# Patient Record
Sex: MALE | Race: WHITE | Employment: FULL TIME | URBAN - METROPOLITAN AREA
[De-identification: names, ages, dates, MRNs, and addresses within clinical notes are randomized per-mention and may not be internally consistent; named-entity substitution may affect disease eponyms.]

---

## 2017-03-07 ENCOUNTER — OFFICE VISIT (OUTPATIENT)
Dept: FAMILY MEDICINE CLINIC | Age: 57
End: 2017-03-07

## 2017-03-07 VITALS
HEART RATE: 64 BPM | HEIGHT: 73 IN | DIASTOLIC BLOOD PRESSURE: 84 MMHG | WEIGHT: 218 LBS | RESPIRATION RATE: 12 BRPM | TEMPERATURE: 98.3 F | SYSTOLIC BLOOD PRESSURE: 128 MMHG | BODY MASS INDEX: 28.89 KG/M2

## 2017-03-07 DIAGNOSIS — F41.0 PANIC ATTACK: ICD-10-CM

## 2017-03-07 DIAGNOSIS — K21.9 GASTROESOPHAGEAL REFLUX DISEASE WITHOUT ESOPHAGITIS: ICD-10-CM

## 2017-03-07 DIAGNOSIS — G62.9 NEUROPATHY: ICD-10-CM

## 2017-03-07 DIAGNOSIS — I65.29 STENOSIS OF CAROTID ARTERY, UNSPECIFIED LATERALITY: ICD-10-CM

## 2017-03-07 DIAGNOSIS — I25.10 CORONARY ARTERY DISEASE INVOLVING NATIVE CORONARY ARTERY OF NATIVE HEART WITHOUT ANGINA PECTORIS: ICD-10-CM

## 2017-03-07 DIAGNOSIS — F41.9 ANXIETY: Primary | ICD-10-CM

## 2017-03-07 PROCEDURE — 3017F COLORECTAL CA SCREEN DOC REV: CPT | Performed by: FAMILY MEDICINE

## 2017-03-07 PROCEDURE — 99214 OFFICE O/P EST MOD 30 MIN: CPT | Performed by: FAMILY MEDICINE

## 2017-03-07 PROCEDURE — 1036F TOBACCO NON-USER: CPT | Performed by: FAMILY MEDICINE

## 2017-03-07 PROCEDURE — G8484 FLU IMMUNIZE NO ADMIN: HCPCS | Performed by: FAMILY MEDICINE

## 2017-03-07 PROCEDURE — G8419 CALC BMI OUT NRM PARAM NOF/U: HCPCS | Performed by: FAMILY MEDICINE

## 2017-03-07 PROCEDURE — G8427 DOCREV CUR MEDS BY ELIG CLIN: HCPCS | Performed by: FAMILY MEDICINE

## 2017-03-07 PROCEDURE — G8598 ASA/ANTIPLAT THER USED: HCPCS | Performed by: FAMILY MEDICINE

## 2017-03-07 RX ORDER — LORAZEPAM 0.5 MG/1
0.5 TABLET ORAL EVERY 8 HOURS PRN
Qty: 30 TABLET | Refills: 0 | Status: SHIPPED | OUTPATIENT
Start: 2017-03-07 | End: 2017-04-17

## 2017-03-07 RX ORDER — CITALOPRAM 10 MG/1
10 TABLET ORAL DAILY
Qty: 30 TABLET | Refills: 1 | Status: SHIPPED | OUTPATIENT
Start: 2017-03-07 | End: 2017-04-17 | Stop reason: SDUPTHER

## 2017-03-07 RX ORDER — LORAZEPAM 0.5 MG/1
TABLET ORAL
COMMUNITY
Start: 2017-03-05

## 2017-04-17 ENCOUNTER — OFFICE VISIT (OUTPATIENT)
Dept: FAMILY MEDICINE CLINIC | Age: 57
End: 2017-04-17

## 2017-04-17 VITALS
SYSTOLIC BLOOD PRESSURE: 128 MMHG | TEMPERATURE: 98 F | DIASTOLIC BLOOD PRESSURE: 80 MMHG | BODY MASS INDEX: 28.12 KG/M2 | HEIGHT: 73 IN | RESPIRATION RATE: 16 BRPM | WEIGHT: 212.2 LBS | HEART RATE: 70 BPM

## 2017-04-17 DIAGNOSIS — I65.29 STENOSIS OF CAROTID ARTERY, UNSPECIFIED LATERALITY: ICD-10-CM

## 2017-04-17 DIAGNOSIS — I25.10 CORONARY ARTERY DISEASE INVOLVING NATIVE CORONARY ARTERY OF NATIVE HEART WITHOUT ANGINA PECTORIS: Primary | ICD-10-CM

## 2017-04-17 DIAGNOSIS — G62.9 NEUROPATHY: ICD-10-CM

## 2017-04-17 DIAGNOSIS — K21.9 GASTROESOPHAGEAL REFLUX DISEASE WITHOUT ESOPHAGITIS: ICD-10-CM

## 2017-04-17 DIAGNOSIS — Z12.11 SCREENING FOR MALIGNANT NEOPLASM OF COLON: ICD-10-CM

## 2017-04-17 PROCEDURE — G8598 ASA/ANTIPLAT THER USED: HCPCS | Performed by: FAMILY MEDICINE

## 2017-04-17 PROCEDURE — 1036F TOBACCO NON-USER: CPT | Performed by: FAMILY MEDICINE

## 2017-04-17 PROCEDURE — 99214 OFFICE O/P EST MOD 30 MIN: CPT | Performed by: FAMILY MEDICINE

## 2017-04-17 PROCEDURE — G8419 CALC BMI OUT NRM PARAM NOF/U: HCPCS | Performed by: FAMILY MEDICINE

## 2017-04-17 PROCEDURE — 3017F COLORECTAL CA SCREEN DOC REV: CPT | Performed by: FAMILY MEDICINE

## 2017-04-17 PROCEDURE — G8427 DOCREV CUR MEDS BY ELIG CLIN: HCPCS | Performed by: FAMILY MEDICINE

## 2017-04-17 RX ORDER — LORAZEPAM 0.5 MG/1
0.5 TABLET ORAL EVERY 8 HOURS PRN
Qty: 30 TABLET | Refills: 0 | Status: SHIPPED | OUTPATIENT
Start: 2017-04-17 | End: 2018-02-08 | Stop reason: SDUPTHER

## 2017-04-17 RX ORDER — CITALOPRAM 10 MG/1
10 TABLET ORAL DAILY
Qty: 30 TABLET | Refills: 3 | Status: SHIPPED | OUTPATIENT
Start: 2017-04-17 | End: 2017-05-09 | Stop reason: SDUPTHER

## 2017-04-17 ASSESSMENT — PATIENT HEALTH QUESTIONNAIRE - PHQ9
1. LITTLE INTEREST OR PLEASURE IN DOING THINGS: 0
SUM OF ALL RESPONSES TO PHQ9 QUESTIONS 1 & 2: 0
2. FEELING DOWN, DEPRESSED OR HOPELESS: 0
SUM OF ALL RESPONSES TO PHQ QUESTIONS 1-9: 0

## 2017-05-09 RX ORDER — CITALOPRAM 10 MG/1
10 TABLET ORAL DAILY
Qty: 30 TABLET | Refills: 3 | Status: SHIPPED | OUTPATIENT
Start: 2017-05-09 | End: 2018-02-08 | Stop reason: SDUPTHER

## 2018-02-06 RX ORDER — CITALOPRAM 10 MG/1
10 TABLET ORAL DAILY
Qty: 30 TABLET | Refills: 0 | OUTPATIENT
Start: 2018-02-06 | End: 2018-03-08

## 2018-02-06 RX ORDER — LORAZEPAM 0.5 MG/1
0.5 TABLET ORAL EVERY 8 HOURS PRN
Qty: 30 TABLET | Refills: 0 | OUTPATIENT
Start: 2018-02-06 | End: 2018-03-08

## 2018-02-06 NOTE — TELEPHONE ENCOUNTER
Pt called requesting a refill on his medication. He stated he has one more of each pill left. He would like to have some to get him through until he sees you on 2/22/18. PATIENT LAST SEEN 7/17/17. LAST REFILL 5/9/17 (celexa) and 4/17/17 (ativan).     Future Appointments  Date Time Provider Anastasia Reno   2/22/2018 3:00 PM Villa Ram MD [facility]

## 2018-02-08 RX ORDER — CITALOPRAM 10 MG/1
10 TABLET ORAL DAILY
Qty: 30 TABLET | Refills: 3 | Status: SHIPPED | OUTPATIENT
Start: 2018-02-08 | End: 2018-05-06 | Stop reason: SDUPTHER

## 2018-02-08 RX ORDER — LORAZEPAM 0.5 MG/1
0.5 TABLET ORAL EVERY 8 HOURS PRN
Qty: 30 TABLET | Refills: 0 | Status: SHIPPED | OUTPATIENT
Start: 2018-02-08 | End: 2019-02-04 | Stop reason: SDUPTHER

## 2018-02-08 NOTE — TELEPHONE ENCOUNTER
Controlled Substances Monitoring: The Prescription Monitoring Report for this patient was reviewed today. Scott Hewitt MD)    No signs of potential drug abuse or diversion identified. Scott Hewitt MD)    Severe pain not adequately treated with lower dose. Scott Hewitt MD)    Dose reduction has been attempted.  Scott Hewitt MD)

## 2018-02-22 ENCOUNTER — OFFICE VISIT (OUTPATIENT)
Dept: FAMILY MEDICINE CLINIC | Age: 58
End: 2018-02-22
Payer: COMMERCIAL

## 2018-02-22 VITALS
DIASTOLIC BLOOD PRESSURE: 84 MMHG | TEMPERATURE: 98.6 F | SYSTOLIC BLOOD PRESSURE: 126 MMHG | HEART RATE: 80 BPM | BODY MASS INDEX: 29.29 KG/M2 | RESPIRATION RATE: 18 BRPM | WEIGHT: 221 LBS | HEIGHT: 73 IN

## 2018-02-22 DIAGNOSIS — K21.9 GASTROESOPHAGEAL REFLUX DISEASE WITHOUT ESOPHAGITIS: ICD-10-CM

## 2018-02-22 DIAGNOSIS — Z12.5 PROSTATE CANCER SCREENING: ICD-10-CM

## 2018-02-22 DIAGNOSIS — F41.1 GAD (GENERALIZED ANXIETY DISORDER): ICD-10-CM

## 2018-02-22 DIAGNOSIS — I65.29 STENOSIS OF CAROTID ARTERY, UNSPECIFIED LATERALITY: ICD-10-CM

## 2018-02-22 DIAGNOSIS — G62.9 NEUROPATHY: ICD-10-CM

## 2018-02-22 DIAGNOSIS — I25.10 CORONARY ARTERY DISEASE INVOLVING NATIVE CORONARY ARTERY OF NATIVE HEART WITHOUT ANGINA PECTORIS: Primary | ICD-10-CM

## 2018-02-22 PROCEDURE — G8484 FLU IMMUNIZE NO ADMIN: HCPCS | Performed by: FAMILY MEDICINE

## 2018-02-22 PROCEDURE — G8419 CALC BMI OUT NRM PARAM NOF/U: HCPCS | Performed by: FAMILY MEDICINE

## 2018-02-22 PROCEDURE — 3017F COLORECTAL CA SCREEN DOC REV: CPT | Performed by: FAMILY MEDICINE

## 2018-02-22 PROCEDURE — 1036F TOBACCO NON-USER: CPT | Performed by: FAMILY MEDICINE

## 2018-02-22 PROCEDURE — 99214 OFFICE O/P EST MOD 30 MIN: CPT | Performed by: FAMILY MEDICINE

## 2018-02-22 PROCEDURE — G8427 DOCREV CUR MEDS BY ELIG CLIN: HCPCS | Performed by: FAMILY MEDICINE

## 2018-02-22 PROCEDURE — G8599 NO ASA/ANTIPLAT THER USE RNG: HCPCS | Performed by: FAMILY MEDICINE

## 2018-02-22 NOTE — PROGRESS NOTES
Chief Complaint   Patient presents with    Anxiety   panic attack symptoms are improved at this time  Less stress at work  Medications help  He would like refill  Worse with stress in his life  Previous Similar Symptoms    anxiety symptoms also present for now  No depression symptoms  No suicidal/homocidal ideation. No psychotic or manic symptoms. atypical cp has resolved  No angina  He declines further testing    gerd sx have been stable  No red flag symptoms reported  No bleeding    Needs colon cancer screening  He will consider but declines at this time    Patient presents for  exam.        Patient Active Problem List   Diagnosis    CAD (coronary artery disease)    GERD (gastroesophageal reflux disease)    Neuropathy ([facility] [address]    Carotid stenosis    RIVAS (generalized anxiety disorder)       has a current medication list which includes the following prescription(s): citalopram and lorazepam.    History reviewed. No pertinent past medical history. History reviewed. No pertinent surgical history. family history is not on file. Social History     Social History    Marital status:      Spouse name: N/A    Number of children: N/A    Years of education: N/A     Occupational History    Not on file.      Social History Main Topics    Smoking status: Former Smoker    Smokeless tobacco: Never Used    Alcohol use Not on file    Drug use: Unknown    Sexual activity: Not on file     Other Topics Concern    Not on file     Social History Narrative    No narrative on file       No Known Allergies    Review of Systems - General ROS: negative  Psychological ROS: negative  ENT ROS: negative  Hematological and Lymphatic ROS: negative  Endocrine ROS: negative  Respiratory ROS: no cough, shortness of breath, or wheezing  Cardiovascular ROS: no chest pain no dyspnea on exertion  Gastrointestinal ROS: no abdominal pain, change in bowel habits, or black or bloody stools  Genito-Urinary ROS: no dysuria, Date:   2/22/2019    Lipid Panel     Standing Status:   Future     Standing Expiration Date:   2/22/2019     Order Specific Question:   Is Patient Fasting?/# of Hours     Answer:   10-12    PSA Screening     Standing Status:   Future     Standing Expiration Date:   2/22/2019       Outpatient Encounter Prescriptions as of 2/22/2018   Medication Sig Dispense Refill    citalopram (CELEXA) 10 MG tablet Take 1 tablet by mouth daily 30 tablet 3    LORazepam (ATIVAN) 0.5 MG tablet        No facility-administered encounter medications on file as of 2/22/2018. Report any suicidal/homocidal ideation, any psychotic or manic symptoms. Emergency department with new or worsening symptoms    Controlled Substances Monitoring:      No Follow-up on file. Patient education provided. They understand and agree with this course of treatment. They will return with new or worsening symptoms. Patient instructed to remain current with appropriate annual health maintenance.

## 2018-02-23 DIAGNOSIS — I25.10 CORONARY ARTERY DISEASE INVOLVING NATIVE CORONARY ARTERY OF NATIVE HEART WITHOUT ANGINA PECTORIS: ICD-10-CM

## 2018-02-23 DIAGNOSIS — K21.9 GASTROESOPHAGEAL REFLUX DISEASE WITHOUT ESOPHAGITIS: ICD-10-CM

## 2018-02-23 DIAGNOSIS — Z12.5 PROSTATE CANCER SCREENING: ICD-10-CM

## 2018-02-23 DIAGNOSIS — I65.29 STENOSIS OF CAROTID ARTERY, UNSPECIFIED LATERALITY: ICD-10-CM

## 2018-02-23 DIAGNOSIS — G62.9 NEUROPATHY: ICD-10-CM

## 2018-02-23 DIAGNOSIS — F41.1 GAD (GENERALIZED ANXIETY DISORDER): ICD-10-CM

## 2018-02-23 LAB
ALBUMIN SERPL-MCNC: 4.4 G/DL (ref 3.9–4.9)
ALP BLD-CCNC: 83 U/L (ref 35–104)
ALT SERPL-CCNC: 8 U/L (ref 0–41)
ANION GAP SERPL CALCULATED.3IONS-SCNC: 17 MEQ/L (ref 7–13)
AST SERPL-CCNC: 12 U/L (ref 0–40)
BILIRUB SERPL-MCNC: 0.4 MG/DL (ref 0–1.2)
BUN BLDV-MCNC: 11 MG/DL (ref 6–20)
CALCIUM SERPL-MCNC: 9.5 MG/DL (ref 8.6–10.2)
CHLORIDE BLD-SCNC: 98 MEQ/L (ref 98–107)
CHOLESTEROL, TOTAL: 193 MG/DL (ref 0–199)
CO2: 24 MEQ/L (ref 22–29)
CREAT SERPL-MCNC: 1.02 MG/DL (ref 0.7–1.2)
GFR AFRICAN AMERICAN: >60
GFR NON-AFRICAN AMERICAN: >60
GLOBULIN: 2.4 G/DL (ref 2.3–3.5)
GLUCOSE BLD-MCNC: 83 MG/DL (ref 74–109)
HDLC SERPL-MCNC: 44 MG/DL (ref 40–59)
LDL CHOLESTEROL CALCULATED: 129 MG/DL (ref 0–129)
POTASSIUM SERPL-SCNC: 4.6 MEQ/L (ref 3.5–5.1)
PROSTATE SPECIFIC ANTIGEN: 0.8 NG/ML (ref 0–3.89)
SODIUM BLD-SCNC: 139 MEQ/L (ref 132–144)
TOTAL PROTEIN: 6.8 G/DL (ref 6.4–8.1)
TRIGL SERPL-MCNC: 101 MG/DL (ref 0–200)

## 2018-05-07 RX ORDER — CITALOPRAM 10 MG/1
10 TABLET ORAL DAILY
Qty: 30 TABLET | Refills: 0 | Status: SHIPPED | OUTPATIENT
Start: 2018-05-07 | End: 2018-06-03 | Stop reason: SDUPTHER

## 2018-06-04 RX ORDER — CITALOPRAM 10 MG/1
10 TABLET ORAL DAILY
Qty: 30 TABLET | Refills: 0 | Status: SHIPPED | OUTPATIENT
Start: 2018-06-04 | End: 2018-07-08 | Stop reason: SDUPTHER

## 2018-07-09 RX ORDER — CITALOPRAM 10 MG/1
10 TABLET ORAL DAILY
Qty: 30 TABLET | Refills: 0 | Status: SHIPPED | OUTPATIENT
Start: 2018-07-09 | End: 2018-08-05 | Stop reason: SDUPTHER

## 2018-07-09 NOTE — TELEPHONE ENCOUNTER
PHARMACY REQUESTING REFILL  PATIENT LAST SEEN 2/22/18  LAST REFILL 6/4/18  PLEASE APPROVE OR DENY. No future appointments.

## 2018-08-06 RX ORDER — CITALOPRAM 10 MG/1
10 TABLET ORAL DAILY
Qty: 30 TABLET | Refills: 0 | Status: SHIPPED | OUTPATIENT
Start: 2018-08-06 | End: 2018-09-09 | Stop reason: SDUPTHER

## 2018-09-09 NOTE — TELEPHONE ENCOUNTER
Requested Prescriptions     Pending Prescriptions Disp Refills    citalopram (CELEXA) 10 MG tablet [Pharmacy Med Name: CITALOPRAM HBR 10 MG TABLET] 30 tablet 0     Sig: TAKE 1 TABLET BY MOUTH DAILY

## 2018-09-10 RX ORDER — CITALOPRAM 10 MG/1
10 TABLET ORAL DAILY
Qty: 30 TABLET | Refills: 0 | Status: SHIPPED | OUTPATIENT
Start: 2018-09-10 | End: 2018-10-08 | Stop reason: SDUPTHER

## 2018-10-08 RX ORDER — CITALOPRAM 10 MG/1
10 TABLET ORAL DAILY
Qty: 30 TABLET | Refills: 0 | Status: SHIPPED | OUTPATIENT
Start: 2018-10-08 | End: 2018-10-30 | Stop reason: SDUPTHER

## 2018-10-30 ENCOUNTER — TELEPHONE (OUTPATIENT)
Dept: FAMILY MEDICINE CLINIC | Age: 58
End: 2018-10-30

## 2018-10-30 RX ORDER — CITALOPRAM 10 MG/1
10 TABLET ORAL DAILY
Qty: 90 TABLET | Refills: 0 | Status: SHIPPED | OUTPATIENT
Start: 2018-10-30 | End: 2019-01-30 | Stop reason: SDUPTHER

## 2019-01-31 RX ORDER — CITALOPRAM 10 MG/1
TABLET ORAL
Qty: 90 TABLET | Refills: 0 | Status: SHIPPED | OUTPATIENT
Start: 2019-01-31 | End: 2019-02-04 | Stop reason: SDUPTHER

## 2019-02-04 ENCOUNTER — OFFICE VISIT (OUTPATIENT)
Dept: FAMILY MEDICINE CLINIC | Age: 59
End: 2019-02-04
Payer: COMMERCIAL

## 2019-02-04 VITALS
DIASTOLIC BLOOD PRESSURE: 86 MMHG | HEART RATE: 84 BPM | BODY MASS INDEX: 28.36 KG/M2 | SYSTOLIC BLOOD PRESSURE: 124 MMHG | WEIGHT: 214 LBS | HEIGHT: 73 IN | RESPIRATION RATE: 16 BRPM | TEMPERATURE: 97.6 F

## 2019-02-04 DIAGNOSIS — K21.9 GASTROESOPHAGEAL REFLUX DISEASE WITHOUT ESOPHAGITIS: ICD-10-CM

## 2019-02-04 DIAGNOSIS — F41.1 GAD (GENERALIZED ANXIETY DISORDER): ICD-10-CM

## 2019-02-04 DIAGNOSIS — G62.9 NEUROPATHY: ICD-10-CM

## 2019-02-04 DIAGNOSIS — I25.10 CORONARY ARTERY DISEASE INVOLVING NATIVE CORONARY ARTERY OF NATIVE HEART WITHOUT ANGINA PECTORIS: Primary | ICD-10-CM

## 2019-02-04 DIAGNOSIS — I65.29 STENOSIS OF CAROTID ARTERY, UNSPECIFIED LATERALITY: ICD-10-CM

## 2019-02-04 DIAGNOSIS — M15.9 PRIMARY OSTEOARTHRITIS INVOLVING MULTIPLE JOINTS: ICD-10-CM

## 2019-02-04 DIAGNOSIS — Z12.11 COLON CANCER SCREENING: ICD-10-CM

## 2019-02-04 PROCEDURE — 3017F COLORECTAL CA SCREEN DOC REV: CPT | Performed by: FAMILY MEDICINE

## 2019-02-04 PROCEDURE — G8599 NO ASA/ANTIPLAT THER USE RNG: HCPCS | Performed by: FAMILY MEDICINE

## 2019-02-04 PROCEDURE — G8427 DOCREV CUR MEDS BY ELIG CLIN: HCPCS | Performed by: FAMILY MEDICINE

## 2019-02-04 PROCEDURE — 1036F TOBACCO NON-USER: CPT | Performed by: FAMILY MEDICINE

## 2019-02-04 PROCEDURE — G8419 CALC BMI OUT NRM PARAM NOF/U: HCPCS | Performed by: FAMILY MEDICINE

## 2019-02-04 PROCEDURE — 99214 OFFICE O/P EST MOD 30 MIN: CPT | Performed by: FAMILY MEDICINE

## 2019-02-04 PROCEDURE — G8484 FLU IMMUNIZE NO ADMIN: HCPCS | Performed by: FAMILY MEDICINE

## 2019-02-04 RX ORDER — CITALOPRAM 10 MG/1
TABLET ORAL
Qty: 90 TABLET | Refills: 0 | Status: SHIPPED | OUTPATIENT
Start: 2019-02-04

## 2019-02-04 RX ORDER — LORAZEPAM 0.5 MG/1
0.5 TABLET ORAL EVERY 8 HOURS PRN
Qty: 30 TABLET | Refills: 0 | Status: SHIPPED | OUTPATIENT
Start: 2019-02-04 | End: 2019-03-06

## 2019-02-04 ASSESSMENT — PATIENT HEALTH QUESTIONNAIRE - PHQ9
2. FEELING DOWN, DEPRESSED OR HOPELESS: 0
SUM OF ALL RESPONSES TO PHQ QUESTIONS 1-9: 0
1. LITTLE INTEREST OR PLEASURE IN DOING THINGS: 0
SUM OF ALL RESPONSES TO PHQ QUESTIONS 1-9: 0
SUM OF ALL RESPONSES TO PHQ9 QUESTIONS 1 & 2: 0

## 2019-02-18 ENCOUNTER — TELEPHONE (OUTPATIENT)
Dept: FAMILY MEDICINE CLINIC | Age: 59
End: 2019-02-18

## 2019-03-07 ENCOUNTER — TELEPHONE (OUTPATIENT)
Dept: ADMINISTRATIVE | Age: 59
End: 2019-03-07

## 2024-02-16 PROBLEM — M54.2 NECK PAIN: Status: ACTIVE | Noted: 2024-02-16

## 2024-02-16 PROBLEM — F32.A DEPRESSION: Status: ACTIVE | Noted: 2024-02-16

## 2024-02-16 PROBLEM — M77.10 LATERAL EPICONDYLITIS: Status: ACTIVE | Noted: 2024-02-16

## 2024-02-16 PROBLEM — M25.519 SHOULDER PAIN: Status: ACTIVE | Noted: 2024-02-16

## 2024-02-16 PROBLEM — M19.011 ARTHROPATHY OF RIGHT SHOULDER: Status: ACTIVE | Noted: 2024-02-16

## 2024-02-16 PROBLEM — S49.90XS SHOULDER INJURY, SEQUELA: Status: ACTIVE | Noted: 2024-02-16

## 2024-02-16 PROBLEM — S43.401A: Status: ACTIVE | Noted: 2024-02-16

## 2024-02-16 RX ORDER — CITALOPRAM 10 MG/1
1 TABLET ORAL DAILY
COMMUNITY
Start: 2019-04-29 | End: 2024-02-20 | Stop reason: WASHOUT

## 2024-02-16 RX ORDER — OXYCODONE HYDROCHLORIDE 5 MG/1
TABLET ORAL 4 TIMES DAILY PRN
COMMUNITY
Start: 2020-05-18 | End: 2024-02-20 | Stop reason: WASHOUT

## 2024-02-16 RX ORDER — LORAZEPAM 0.5 MG/1
1 TABLET ORAL 2 TIMES DAILY
COMMUNITY
Start: 2019-02-04 | End: 2024-02-20 | Stop reason: WASHOUT

## 2024-02-16 RX ORDER — IBUPROFEN 600 MG/1
TABLET ORAL 3 TIMES DAILY PRN
COMMUNITY
Start: 2020-05-18 | End: 2024-02-20 | Stop reason: WASHOUT

## 2024-02-19 NOTE — PROGRESS NOTES
"Subjective   Patient ID: Micah Beck is a 63 y.o. male who presents for Leg Cramps.  HPI    Cramping in legs and neck    Cramping comes on with walking and goes away when resting    Some risk factors for vascular disease including tobacco abuse and hyperlipidemia  Watch cholesterol and follow blood work    Due for colon and prostate cancer screening    Depressed mood stable  No suicidal ideation no psychotic or manic symptoms      Review of Systems   Constitutional:  Negative for activity change and fatigue.   HENT:  Negative for congestion and sore throat.    Eyes:  Negative for discharge.   Respiratory:  Negative for cough, chest tightness and shortness of breath.    Cardiovascular:  Negative for chest pain and leg swelling.   Gastrointestinal:  Negative for abdominal pain, blood in stool, constipation, diarrhea, nausea and vomiting.   Endocrine: Negative for cold intolerance and heat intolerance.   Genitourinary:  Negative for difficulty urinating and hematuria.   Musculoskeletal:  Negative for arthralgias, back pain, gait problem, myalgias and neck pain.   Allergic/Immunologic: Negative for environmental allergies.   Neurological:  Negative for dizziness, syncope, weakness, numbness and headaches.   Hematological:  Negative for adenopathy. Does not bruise/bleed easily.   Psychiatric/Behavioral:  Negative for dysphoric mood. The patient is not nervous/anxious.    All other systems reviewed and are negative.      Objective   /82 (BP Location: Left arm, BP Cuff Size: Large adult)   Pulse 85   Temp 36.8 °C (98.2 °F) (Temporal)   Ht 1.854 m (6' 1\")   Wt 100 kg (221 lb 6.4 oz)   SpO2 98%   BMI 29.21 kg/m²    Physical Exam  Vitals and nursing note reviewed.   Constitutional:       General: He is not in acute distress.     Appearance: Normal appearance.   HENT:      Head: Normocephalic and atraumatic.      Right Ear: Tympanic membrane, ear canal and external ear normal.      Left Ear: Tympanic membrane, " ear canal and external ear normal.      Nose: Nose normal.      Mouth/Throat:      Mouth: Mucous membranes are moist.      Pharynx: Oropharynx is clear. No oropharyngeal exudate or posterior oropharyngeal erythema.   Eyes:      Extraocular Movements: Extraocular movements intact.      Conjunctiva/sclera: Conjunctivae normal.      Pupils: Pupils are equal, round, and reactive to light.   Cardiovascular:      Rate and Rhythm: Normal rate and regular rhythm.      Pulses: Normal pulses.      Heart sounds: Normal heart sounds. No murmur heard.  Pulmonary:      Effort: Pulmonary effort is normal. No respiratory distress.      Breath sounds: Normal breath sounds. No wheezing or rales.   Abdominal:      General: Abdomen is flat. Bowel sounds are normal. There is no distension.      Palpations: Abdomen is soft. There is no mass.      Tenderness: There is no abdominal tenderness.   Musculoskeletal:         General: No swelling or deformity. Normal range of motion.      Cervical back: Normal range of motion and neck supple.      Right lower leg: No edema.      Left lower leg: No edema.   Lymphadenopathy:      Cervical: No cervical adenopathy.   Skin:     General: Skin is warm and dry.      Capillary Refill: Capillary refill takes less than 2 seconds.      Findings: No lesion or rash.   Neurological:      General: No focal deficit present.      Mental Status: He is alert and oriented to person, place, and time.      Cranial Nerves: No cranial nerve deficit.      Motor: No weakness.   Psychiatric:         Mood and Affect: Mood normal.         Behavior: Behavior normal.         Thought Content: Thought content normal.         Judgment: Judgment normal.         Assessment/Plan   Problem List Items Addressed This Visit       Current mild episode of major depressive disorder without prior episode (CMS/HCC) - Primary    Neck pain    Claudication (CMS/HCC)    Relevant Orders    Follow Up In [facility] - PCP    CBC and Auto  Differential    Comprehensive Metabolic Panel    Lipid Panel    Vascular US PVR With Exercise     Other Visit Diagnoses       Colon cancer screening        Relevant Orders    Cologuard® colon cancer screening    Mixed hyperlipidemia        Prostate cancer screening        Relevant Orders    Prostate Specific Antigen            Patient education provided.  Stay current with age appropriate health maintenance as instructed.  Appointment here or ER with new or worsening symptoms'  Keep appropriate follow-up visit.  Stay current with proper immunizations   Testing as above  Follow-up visit after test complete  Return sooner with new or worsening symptoms  Risk factor modification recommended

## 2024-02-20 ENCOUNTER — OFFICE VISIT (OUTPATIENT)
Dept: PRIMARY CARE | Facility: CLINIC | Age: 64
End: 2024-02-20
Payer: COMMERCIAL

## 2024-02-20 VITALS
WEIGHT: 221.4 LBS | SYSTOLIC BLOOD PRESSURE: 134 MMHG | HEIGHT: 73 IN | DIASTOLIC BLOOD PRESSURE: 82 MMHG | HEART RATE: 85 BPM | OXYGEN SATURATION: 98 % | BODY MASS INDEX: 29.34 KG/M2 | TEMPERATURE: 98.2 F

## 2024-02-20 DIAGNOSIS — Z12.5 PROSTATE CANCER SCREENING: ICD-10-CM

## 2024-02-20 DIAGNOSIS — I73.9 CLAUDICATION (CMS-HCC): ICD-10-CM

## 2024-02-20 DIAGNOSIS — E78.2 MIXED HYPERLIPIDEMIA: ICD-10-CM

## 2024-02-20 DIAGNOSIS — F32.0 CURRENT MILD EPISODE OF MAJOR DEPRESSIVE DISORDER WITHOUT PRIOR EPISODE (CMS-HCC): Primary | ICD-10-CM

## 2024-02-20 DIAGNOSIS — M54.2 NECK PAIN: ICD-10-CM

## 2024-02-20 DIAGNOSIS — Z12.11 COLON CANCER SCREENING: ICD-10-CM

## 2024-02-20 PROCEDURE — 99214 OFFICE O/P EST MOD 30 MIN: CPT | Performed by: FAMILY MEDICINE

## 2024-02-20 PROCEDURE — 1036F TOBACCO NON-USER: CPT | Performed by: FAMILY MEDICINE

## 2024-02-20 ASSESSMENT — ENCOUNTER SYMPTOMS
BACK PAIN: 0
DIARRHEA: 0
HEMATURIA: 0
ADENOPATHY: 0
NERVOUS/ANXIOUS: 0
VOMITING: 0
ABDOMINAL PAIN: 0
DIFFICULTY URINATING: 0
ACTIVITY CHANGE: 0
EYE DISCHARGE: 0
WEAKNESS: 0
DIZZINESS: 0
SORE THROAT: 0
NECK PAIN: 0
NAUSEA: 0
ARTHRALGIAS: 0
FATIGUE: 0
BLOOD IN STOOL: 0
MYALGIAS: 0
HEADACHES: 0
CONSTIPATION: 0
SHORTNESS OF BREATH: 0
NUMBNESS: 0
BRUISES/BLEEDS EASILY: 0
DYSPHORIC MOOD: 0
COUGH: 0
CHEST TIGHTNESS: 0

## 2024-03-08 ENCOUNTER — HOSPITAL ENCOUNTER (OUTPATIENT)
Dept: RADIOLOGY | Facility: HOSPITAL | Age: 64
Discharge: HOME | End: 2024-03-08
Payer: COMMERCIAL

## 2024-03-08 DIAGNOSIS — I73.9 CLAUDICATION (CMS-HCC): ICD-10-CM

## 2024-03-08 DIAGNOSIS — I70.211 ATHEROSCLEROSIS OF NATIVE ARTERIES OF EXTREMITIES WITH INTERMITTENT CLAUDICATION, RIGHT LEG (CMS-HCC): ICD-10-CM

## 2024-03-08 PROCEDURE — 93924 LWR XTR VASC STDY BILAT: CPT

## 2024-03-09 PROCEDURE — 93924 LWR XTR VASC STDY BILAT: CPT | Performed by: STUDENT IN AN ORGANIZED HEALTH CARE EDUCATION/TRAINING PROGRAM

## 2024-03-11 DIAGNOSIS — I70.90 ATHEROSCLEROSIS: Primary | ICD-10-CM

## 2024-03-18 ENCOUNTER — OFFICE VISIT (OUTPATIENT)
Dept: VASCULAR SURGERY | Facility: CLINIC | Age: 64
End: 2024-03-18
Payer: COMMERCIAL

## 2024-03-18 VITALS
WEIGHT: 224.3 LBS | DIASTOLIC BLOOD PRESSURE: 98 MMHG | SYSTOLIC BLOOD PRESSURE: 178 MMHG | HEIGHT: 73 IN | HEART RATE: 68 BPM | BODY MASS INDEX: 29.73 KG/M2

## 2024-03-18 DIAGNOSIS — I73.9 CLAUDICATION (CMS-HCC): Primary | ICD-10-CM

## 2024-03-18 DIAGNOSIS — I70.90 ATHEROSCLEROSIS: ICD-10-CM

## 2024-03-18 DIAGNOSIS — Z72.0 TOBACCO USE: ICD-10-CM

## 2024-03-18 PROCEDURE — 99214 OFFICE O/P EST MOD 30 MIN: CPT | Mod: ZK | Performed by: NURSE PRACTITIONER

## 2024-03-18 PROCEDURE — 99204 OFFICE O/P NEW MOD 45 MIN: CPT | Performed by: NURSE PRACTITIONER

## 2024-03-18 PROCEDURE — 1036F TOBACCO NON-USER: CPT | Performed by: NURSE PRACTITIONER

## 2024-03-18 RX ORDER — ATORVASTATIN CALCIUM 40 MG/1
40 TABLET, FILM COATED ORAL DAILY
Qty: 90 EACH | Refills: 3 | Status: SHIPPED | OUTPATIENT
Start: 2024-03-18 | End: 2025-03-18

## 2024-03-18 RX ORDER — ASPIRIN 81 MG/1
81 TABLET ORAL DAILY
Qty: 30 TABLET | Refills: 11 | Status: SHIPPED | OUTPATIENT
Start: 2024-03-18 | End: 2025-03-18

## 2024-03-18 ASSESSMENT — PAIN SCALES - GENERAL: PAINLEVEL: 5

## 2024-03-18 NOTE — PROGRESS NOTES
"NPV REASON: claudication    CURRENT ENCOUNTER:  Micah Beck is 63 y.o. male with a history of HLD and tobacco use. Who was referred to vascular surgery due to claudication. The patient notes a history of cramping in bilateral calf R>L since May 2023. Now notes tingling in the right toes, cramping is worse with walking and improves with rest. He does reports leg cramps overnight while trying to sleep.  Denies current or past wounds to the BLE     Smoked >40 years- 2-3 packs a day, currently smoking    Does report taking ASA intermittently, does not take statin.     Mother  of DVT that progressed after a bone spur surgery    PastMedHX:  No past medical history on file.  HLD  Meds:   No current outpatient medications on file.    Allergies:   No Known Allergies    ROS:  Denies any fevers, chills, diaphoresis, chest pain, palpitations, shortness of breath, back pain, abdominal pain, nausea, vomiting, headache, or numbness. Further ROS in HPI    Objective:  Vitals:  Vitals:    24 1330   BP: (!) 178/98   Pulse:         Exam:  both carotids normal upstroke without bruits, radial pulses normal, aorta normal in size without enlargement, femoral artery pulses normal, color, temperature, sensation in feet normal, no lesions, abnormal foot exam normal DP and PT pulses, no trophic changes or ulcerative lesions, normal sensory exam, DP reduced bilateral, and PT reduced bilateral   Physical Exam  No wounds, ulcers, DP/PT doppled    Labs:  No results found for: \"WBC\", \"HGB\", \"HCT\", \"MCV\", \"PLT\"  No results found for: \"CREATININE\", \"BUN\", \"NA\", \"K\", \"CL\", \"CO2\"      Imaging:    Vascular US PVR w/exercise completed 3/8/24:  CONCLUSIONS:  Right Lower PVR: Evidence of mild arterial occlusive disease in the right lower extremity at rest. Hemodynamics are further compromised in the right lower extremity with exercise. Monophasic flow is noted in the right posterior tibial artery and right dorsalis pedis artery. Biphasic flow " is noted in the right common femoral artery.  Left Lower PVR: No evidence of arterial occlusive disease in the left lower extremity at rest. Biphasic flow is noted in the left common femoral artery, left posterior tibial artery and left dorsalis pedis artery.  Exercise:Exercise completed at 1.7 for 4 minutes. The exam was terminated due to right calf pain. Slight decrease right ankle post exercise however still mild disease.     Imaging & Doppler Findings:     RIGHT Lower PVR                  Pressures Ratios  Right Posterior Tibial (Ankle) 126 mmHg  0.84  Right Dorsalis Pedis (Ankle)   124 mmHg  0.83  Right Digit (Great Toe)            101 mmHg  0.67  Right Ankle Post Exercise         118 mmHg  0.80     LEFT Lower PVR                   Pressures Ratios  Left Posterior Tibial (Ankle)   161 mmHg  1.07  Left Dorsalis Pedis (Ankle)     162 mmHg  1.08  Left Digit (Great Toe)             179 mmHg  1.19  Left Ankle Post Exercise         185 mmHg  1.25                                 Right                         Left  Brachial Pressure 150 mmHg              150 mmHg    Assessment & Plan:  Micah Beck is 63 y.o. male with history of tobacco use disorder and HLD who is presents with claudication.  He completed vascular US PVR w/exercise completed 3/8/24 which was significant for arterial occlusive disease.    1) Smoking cessation education provided, a referral for the smoking cessation program was placed  2) Increase daily exercise  3) Vascular rehab referral was made for claudication  4) Educated patient on assessing skin, legs and feet often for wounds  5) Starting 81 mg ASA daily and 40 mg atorvastatin daily. Rx sent to pharmacy  6) Medical optimization and risk modification  7) PCP to manage HTN  8) Follow up with us in 3-6 months to assess symptoms    Delia Keane, WILLY, APRN-CNP, AGPCNP-C, FNP-C, AGACNP-BC  Senior Nurse Practitioner, Vascular Surgery  [facility], [facility] &  [facility]  [facility]  [id number] [place] [address] [address], Office [phone]

## 2024-03-18 NOTE — PATIENT INSTRUCTIONS
It was a pleasure taking care of you today and appreciate your seeing us at our [facility].     Today's plan is as follows:  1) Work towards quitting smoking, a referral for the smoking cessation program was placed  2) Increase your activity to walking most days of the week  3) A vascular rehab referral was made for claudication  4) Check legs and feet often for wounds  5) Start 81 mg ASA daily and 40 mg atorvastatin daily. Rx sent to pharmacy  6) Follow up with us in 3-6 months to assess symptoms    Please call the office with any questions at [phone].   You can speak to our secretaries or our clinical nurses for specific questions.   If you need coordinating your appointments and testing you can do these at the  or by calling my office shortly after your visit.

## 2024-04-22 ENCOUNTER — APPOINTMENT (OUTPATIENT)
Dept: CARDIAC REHAB | Facility: HOSPITAL | Age: 64
End: 2024-04-22
Payer: COMMERCIAL

## 2024-04-22 ENCOUNTER — CLINICAL SUPPORT (OUTPATIENT)
Dept: CARDIAC REHAB | Facility: HOSPITAL | Age: 64
End: 2024-04-22
Payer: COMMERCIAL

## 2024-04-22 VITALS
OXYGEN SATURATION: 95 % | DIASTOLIC BLOOD PRESSURE: 88 MMHG | SYSTOLIC BLOOD PRESSURE: 158 MMHG | HEIGHT: 73 IN | WEIGHT: 217.37 LBS | BODY MASS INDEX: 28.81 KG/M2

## 2024-04-22 DIAGNOSIS — I73.9 PAD (PERIPHERAL ARTERY DISEASE) (CMS-HCC): ICD-10-CM

## 2024-04-22 ASSESSMENT — PATIENT HEALTH QUESTIONNAIRE - PHQ9
SUM OF ALL RESPONSES TO PHQ9 QUESTIONS 1 & 2: 0
6. FEELING BAD ABOUT YOURSELF - OR THAT YOU ARE A FAILURE OR HAVE LET YOURSELF OR YOUR FAMILY DOWN: NOT AT ALL
SUM OF ALL RESPONSES TO PHQ QUESTIONS 1-9: 3
7. TROUBLE CONCENTRATING ON THINGS, SUCH AS READING THE NEWSPAPER OR WATCHING TELEVISION: NOT AT ALL
SUM OF ALL RESPONSES TO PHQ QUESTIONS 1-9: 3
3. TROUBLE FALLING OR STAYING ASLEEP OR SLEEPING TOO MUCH: SEVERAL DAYS
1. LITTLE INTEREST OR PLEASURE IN DOING THINGS: NOT AT ALL
8. MOVING OR SPEAKING SO SLOWLY THAT OTHER PEOPLE COULD HAVE NOTICED. OR THE OPPOSITE, BEING SO FIGETY OR RESTLESS THAT YOU HAVE BEEN MOVING AROUND A LOT MORE THAN USUAL: NOT AT ALL
4. FEELING TIRED OR HAVING LITTLE ENERGY: MORE THAN HALF THE DAYS
5. POOR APPETITE OR OVEREATING: NOT AT ALL
2. FEELING DOWN, DEPRESSED OR HOPELESS: NOT AT ALL
9. THOUGHTS THAT YOU WOULD BE BETTER OFF DEAD, OR OF HURTING YOURSELF: NOT AT ALL

## 2024-04-22 ASSESSMENT — DUKE ACTIVITY SCORE INDEX (DASI)
CAN YOU PARTICIPATE IN MODERATE RECREATIONAL ACTIVITIES LIKE GOLF, BOWLING, DANCING, DOUBLES TENNIS OR THROWING A BASEBALL OR FOOTBALL: YES
CAN YOU DO YARD WORK LIKE RAKING LEAVES, WEEDING OR PUSHING A MOWER: NO
CAN YOU CLIMB A FLIGHT OF STAIRS OR WALK UP A HILL: YES
CAN YOU DO MODERATE WORK AROUND THE HOUSE LIKE VACUUMING, SWEEPING FLOORS OR CARRYING GROCERIES: YES
CAN YOU RUN A SHORT DISTANCE: NO
CAN YOU PARTICIPATE IN STRENOUS SPORTS LIKE SWIMMING, SINGLES TENNIS, FOOTBALL, BASKETBALL, OR SKIING: NO
CAN YOU HAVE SEXUAL RELATIONS: YES
CAN YOU DO HEAVY WORK AROUND THE HOUSE LIKE SCRUBBING FLOORS OR LIFTING AND MOVING HEAVY FURNITURE: NO
TOTAL_SCORE: 27.45
DASI METS SCORE: 6.1
CAN YOU WALK A BLOCK OR TWO ON LEVEL GROUND: NO
CAN YOU DO LIGHT WORK AROUND THE HOUSE LIKE DUSTING OR WASHING DISHES: YES
CAN YOU TAKE CARE OF YOURSELF (EAT, DRESS, BATHE, OR USE TOILET): YES
CAN YOU WALK INDOORS, SUCH AS AROUND YOUR HOUSE: YES

## 2024-04-22 NOTE — PROGRESS NOTES
"  Vascular Rehabilitation Initial Treatment Plan    Name: Micah Beck  Medical Record Number: [id number]  YOB: 1960  Age: 64 y.o.    Today’s Date: 4/22/2024  Primary Care Physician: Peter Nguyen MD  Referring Physician: Monica Horn MD  Program Location: [facility]  Primary Diagnosis:   1. PAD (peripheral artery disease) (CMS-[facility])  Referral to Vascular Rehab         Onset/Date of Diagnosis: 2-    Initial Assessment, not yet started program.    AACVPR Risk Stratification: Moderate     Falls Risk: Medium  Psychosocial Assessment     Pre PHQ-9: 3      Sent PH-Q 9 to MD if score > 20: No; score < 20    Pt reported/currently experiencing stress: No  Patient uses stress management skills: Yes     Currently seeing a mental health provider: No  Social Support: Yes, Whom: wife  Quality of Life Survey: SF-36   SF-36 Pre Post   Physical Component Score TBD TBD   Mental Component Score TBD TBD     Learning Assessment:  Learning assessment/barriers: None  Preferred learning method: Writing handout  Barriers: None  Comments:    Stages of Change:Action    Psychosocial Plan    Goal Status: Initial Assessment; goals not yet started    Psychosocial Goals: Demonstrating proper techniques for stress management and Maintain or lower PH-Q 9 score by discharge    Psychosocial Interventions/Education: To be done in Cardiac Rehab.        Nutrition Assessment:    Hyperlipidemia: Yes     Lipids:   No results found for: \"CHOL\", \"HDL\", \"LDLF\", \"TRIG\"    Current Dietary Guidelines:  regular diet  Barriers to dietary change: no    Diet Habit Survey: Picture Your Plate  Pre:  52  Post: To be done at discharge.    Diabetes Assessment    No results found for: \"HGBA1C\"    History of Diabetes: No    Weight Management    Height: 185.4 cm (6' 0.99\")  Weight: 98.6 kg (217 lb 6 oz)  BMI (Calculated): 28.69      Nutrition Plan    Goal Status: Initial Assessment; goals not yet started    Nutrition Goals: " Improve Diet Habit Survey score by 5-10 points by discharge and Adapt a low-sodium, DASH diet prior to discharge    Nutrition Interventions/Education:   To be done in Cardiac Rehab.        Exercise Assessment    Yes  Mode: treadmill  Frequency: 6x week  Duration: 10 min    Exercise Prescription     Exercise Prescription based on: [facility] Activity Status Index (DASI) and Patient History    DASI Score: 27.45   MET Score: 6.1   Frequency:  3 days/week   Mode: Treadmill and NuStep   Duration: 30-40 total aerobic minutes   Intensity: RPE 11-14  Target HR:   20-30 beats above resting HR  MET Level: 2.1-2.6  Patient wears supplemental O2: No     Modality Workload METs Duration (minutes)   1 Pre-Exercise      2 Treadmill 2 mph  2.6 8 :00   3 Rest    10:00   4 Treadmill 2 mph  2.6 8 :00   5 Rest    10 :00   6 Treadmill 2 mph 2.6 8:00   7  NuStep 32 khan @ lvl 2 2.1 10:00   8 Post exercise        Resistance Training: No   Home Exercise Prescript-ion given: To be given prior to discharge from program.    Exercise Plan    Goal Status: Initial Assessment; goals not yet started    Exercise Goals: Increase exercise MET level by 5-10% each week, Increase total exercise duration to 30-45 minutes, and Obtain 150 minutes/week of moderate intensity aerobic exercise    Exercise Interventions/Education:   To be done in Cardiac Rehab.        Other Core Components/Risk Factor Assessment:    Medication adherence  Current Medications:   Medication Documentation Review Audit       Reviewed by Yary Dowd RN (Registered Nurse) on 04/22/24 at 1617      Medication Order Taking? Sig Documenting Provider Last Dose Status   aspirin 81 mg EC tablet [id number] Yes Take 1 tablet (81 mg) by mouth once daily. DEVI Mejia DNP Taking Active   atorvastatin (Lipitor) 40 mg tablet [id number] Yes Take 1 tablet (40 mg) by mouth once daily. DEVI Mejia DNP Taking Active                                 Medication compliance:  Yes   Uses pill box/organizer: No    Carries medication list: No     Blood Pressure Management  History of Hypertension: Yes   Medication Changes: No   Resting BP:  Visit Vitals  /88        Heart Failure Management  Hx of Heart Failure: No    Smoking/Tobacco Assessment  Social History     Tobacco Use   Smoking Status Former    Types: Cigarettes   Smokeless Tobacco Never       Other Core Component Plan    Goal Status: Initial Assessment; goals not yet started    Other Core Component Goals: Set tobacco cessation quit date while enrolled and Achieve resting BP of < 130/80 by discharge    Other Core Component Interventions/Education:   To be done in cardiac rehab        Individual Patient Goals:    Decrease pain in legs  Increase length of walking    Goal Status: Initial Assessment; goals not yet started    Staff Comments:      Rehab Staff Signature: Yary Dowd RN

## 2024-04-26 ENCOUNTER — TRANSCRIBE ORDERS (OUTPATIENT)
Dept: CARDIAC REHAB | Facility: HOSPITAL | Age: 64
End: 2024-04-26
Payer: COMMERCIAL

## 2024-04-26 DIAGNOSIS — I70.211 ATHEROSCLEROSIS OF NATIVE ARTERY OF RIGHT LOWER EXTREMITY WITH INTERMITTENT CLAUDICATION (CMS-HCC): Primary | ICD-10-CM

## 2024-04-26 DIAGNOSIS — I73.9 CLAUDICATION (CMS-HCC): Primary | ICD-10-CM

## 2024-04-29 ENCOUNTER — CLINICAL SUPPORT (OUTPATIENT)
Dept: CARDIAC REHAB | Facility: HOSPITAL | Age: 64
End: 2024-04-29
Payer: COMMERCIAL

## 2024-04-29 DIAGNOSIS — I70.211 ATHEROSCLEROSIS OF NATIVE ARTERY OF RIGHT LOWER EXTREMITY WITH INTERMITTENT CLAUDICATION (CMS-HCC): ICD-10-CM

## 2024-04-29 PROCEDURE — 93668 PERIPHERAL VASCULAR REHAB: CPT | Performed by: INTERNAL MEDICINE

## 2024-05-01 ENCOUNTER — CLINICAL SUPPORT (OUTPATIENT)
Dept: CARDIAC REHAB | Facility: HOSPITAL | Age: 64
End: 2024-05-01
Payer: COMMERCIAL

## 2024-05-01 DIAGNOSIS — I70.211 ATHEROSCLEROSIS OF NATIVE ARTERY OF RIGHT LOWER EXTREMITY WITH INTERMITTENT CLAUDICATION (CMS-HCC): ICD-10-CM

## 2024-05-01 PROCEDURE — 93668 PERIPHERAL VASCULAR REHAB: CPT | Performed by: INTERNAL MEDICINE

## 2024-05-01 NOTE — PROGRESS NOTES
Physician Notification   Micah Ebony 1960    Patient reports after his treadmill sessions that his right leg cramps and then his leg goes numb. He states this has been happening for 2-3 weeks at work and his numbness lasts for 2-3 hours. Patient did some stretching post exercise and states the right leg numbness improved with stretching. Nurse Evaluated right foot and was able to Dopple right pedal pulse and right post tibial pulse. No visual discoloration to the skin. Patient advised to go to the ER if ever any symptoms of loss of circulation to R/L legs. Patient with out complaint prior to leaving vascular rehab. See session report

## 2024-05-03 ENCOUNTER — CLINICAL SUPPORT (OUTPATIENT)
Dept: CARDIAC REHAB | Facility: HOSPITAL | Age: 64
End: 2024-05-03
Payer: COMMERCIAL

## 2024-05-03 DIAGNOSIS — I70.211 ATHEROSCLEROSIS OF NATIVE ARTERY OF RIGHT LOWER EXTREMITY WITH INTERMITTENT CLAUDICATION (CMS-HCC): ICD-10-CM

## 2024-05-03 PROCEDURE — 93668 PERIPHERAL VASCULAR REHAB: CPT | Performed by: INTERNAL MEDICINE

## 2024-05-06 ENCOUNTER — CLINICAL SUPPORT (OUTPATIENT)
Dept: CARDIAC REHAB | Facility: HOSPITAL | Age: 64
End: 2024-05-06
Payer: COMMERCIAL

## 2024-05-06 DIAGNOSIS — I70.211 ATHEROSCLEROSIS OF NATIVE ARTERY OF RIGHT LOWER EXTREMITY WITH INTERMITTENT CLAUDICATION (CMS-HCC): ICD-10-CM

## 2024-05-06 PROCEDURE — 93798 PHYS/QHP OP CAR RHAB W/ECG: CPT | Performed by: INTERNAL MEDICINE

## 2024-05-06 PROCEDURE — 93668 PERIPHERAL VASCULAR REHAB: CPT | Performed by: INTERNAL MEDICINE

## 2024-05-10 ENCOUNTER — CLINICAL SUPPORT (OUTPATIENT)
Dept: CARDIAC REHAB | Facility: HOSPITAL | Age: 64
End: 2024-05-10
Payer: COMMERCIAL

## 2024-05-10 ENCOUNTER — TELEPHONE (OUTPATIENT)
Dept: CARDIOLOGY | Facility: CLINIC | Age: 64
End: 2024-05-10

## 2024-05-10 DIAGNOSIS — I70.211 ATHEROSCLEROSIS OF NATIVE ARTERY OF RIGHT LOWER EXTREMITY WITH INTERMITTENT CLAUDICATION (CMS-HCC): ICD-10-CM

## 2024-05-10 PROCEDURE — 93668 PERIPHERAL VASCULAR REHAB: CPT | Performed by: INTERNAL MEDICINE

## 2024-05-10 NOTE — TELEPHONE ENCOUNTER
Received an update from vascular rehab team with complaints of some numbness and right leg cramping post treadmill sessions. Patient states this has been happening 2-3 weeks.   Per the note from vascular rehab- this information was communicated with Delia Keane CNP and was advised to send information to cardiologist.   Patient does not have routine cardiologist on file.     Vascular rehab was ordered by PCP in February for abnormal PVR testing.     This information was then relayed to Dr. Prince Bradley MD [facility] as he is medical director of rehab. I discussed this case with him- he advised me to refer patient to Dr. Ayush Engle MD [facility] for vascular follow up.     When reviewing chart- patient is already established with vascular surgery.  Dr. Ayush Engle MD [facility] advised I send message to Monica Horn, for follow up.   If patient needs to be seen- [facility]

## 2024-05-13 ENCOUNTER — CLINICAL SUPPORT (OUTPATIENT)
Dept: CARDIAC REHAB | Facility: HOSPITAL | Age: 64
End: 2024-05-13
Payer: COMMERCIAL

## 2024-05-13 DIAGNOSIS — I70.211 ATHEROSCLEROSIS OF NATIVE ARTERY OF RIGHT LOWER EXTREMITY WITH INTERMITTENT CLAUDICATION (CMS-HCC): ICD-10-CM

## 2024-05-13 PROCEDURE — 93668 PERIPHERAL VASCULAR REHAB: CPT | Performed by: INTERNAL MEDICINE

## 2024-05-15 ENCOUNTER — CLINICAL SUPPORT (OUTPATIENT)
Dept: CARDIAC REHAB | Facility: HOSPITAL | Age: 64
End: 2024-05-15
Payer: COMMERCIAL

## 2024-05-15 DIAGNOSIS — I70.211 ATHEROSCLEROSIS OF NATIVE ARTERY OF RIGHT LOWER EXTREMITY WITH INTERMITTENT CLAUDICATION (CMS-HCC): ICD-10-CM

## 2024-05-15 PROCEDURE — 93668 PERIPHERAL VASCULAR REHAB: CPT | Performed by: INTERNAL MEDICINE

## 2024-05-16 ENCOUNTER — APPOINTMENT (OUTPATIENT)
Dept: PRIMARY CARE | Facility: CLINIC | Age: 64
End: 2024-05-16
Payer: COMMERCIAL

## 2024-05-17 ENCOUNTER — CLINICAL SUPPORT (OUTPATIENT)
Dept: CARDIAC REHAB | Facility: HOSPITAL | Age: 64
End: 2024-05-17
Payer: COMMERCIAL

## 2024-05-17 DIAGNOSIS — I70.211 ATHEROSCLEROSIS OF NATIVE ARTERY OF RIGHT LOWER EXTREMITY WITH INTERMITTENT CLAUDICATION (CMS-HCC): ICD-10-CM

## 2024-05-17 PROCEDURE — 93798 PHYS/QHP OP CAR RHAB W/ECG: CPT | Performed by: INTERNAL MEDICINE

## 2024-05-20 ENCOUNTER — CLINICAL SUPPORT (OUTPATIENT)
Dept: CARDIAC REHAB | Facility: HOSPITAL | Age: 64
End: 2024-05-20
Payer: COMMERCIAL

## 2024-05-20 DIAGNOSIS — I70.211 ATHEROSCLEROSIS OF NATIVE ARTERY OF RIGHT LOWER EXTREMITY WITH INTERMITTENT CLAUDICATION (CMS-HCC): ICD-10-CM

## 2024-05-20 PROCEDURE — 93668 PERIPHERAL VASCULAR REHAB: CPT | Performed by: INTERNAL MEDICINE

## 2024-05-22 ENCOUNTER — CLINICAL SUPPORT (OUTPATIENT)
Dept: CARDIAC REHAB | Facility: HOSPITAL | Age: 64
End: 2024-05-22
Payer: COMMERCIAL

## 2024-05-22 DIAGNOSIS — I70.211 ATHEROSCLEROSIS OF NATIVE ARTERY OF RIGHT LOWER EXTREMITY WITH INTERMITTENT CLAUDICATION (CMS-HCC): ICD-10-CM

## 2024-05-22 PROCEDURE — 93668 PERIPHERAL VASCULAR REHAB: CPT | Performed by: INTERNAL MEDICINE

## 2024-05-29 ENCOUNTER — CLINICAL SUPPORT (OUTPATIENT)
Dept: CARDIAC REHAB | Facility: HOSPITAL | Age: 64
End: 2024-05-29
Payer: COMMERCIAL

## 2024-05-29 DIAGNOSIS — I70.211 ATHEROSCLEROSIS OF NATIVE ARTERY OF RIGHT LOWER EXTREMITY WITH INTERMITTENT CLAUDICATION (CMS-HCC): ICD-10-CM

## 2024-05-29 PROCEDURE — 93668 PERIPHERAL VASCULAR REHAB: CPT | Performed by: INTERNAL MEDICINE

## 2024-06-03 ENCOUNTER — CLINICAL SUPPORT (OUTPATIENT)
Dept: CARDIAC REHAB | Facility: HOSPITAL | Age: 64
End: 2024-06-03
Payer: COMMERCIAL

## 2024-06-03 VITALS
OXYGEN SATURATION: 93 % | BODY MASS INDEX: 28.41 KG/M2 | DIASTOLIC BLOOD PRESSURE: 80 MMHG | WEIGHT: 214.4 LBS | HEIGHT: 73 IN | SYSTOLIC BLOOD PRESSURE: 151 MMHG

## 2024-06-03 DIAGNOSIS — I70.211 ATHEROSCLEROSIS OF NATIVE ARTERY OF RIGHT LOWER EXTREMITY WITH INTERMITTENT CLAUDICATION (CMS-HCC): ICD-10-CM

## 2024-06-03 PROCEDURE — 93668 PERIPHERAL VASCULAR REHAB: CPT | Performed by: INTERNAL MEDICINE

## 2024-06-03 ASSESSMENT — DUKE ACTIVITY SCORE INDEX (DASI)
CAN YOU DO HEAVY WORK AROUND THE HOUSE LIKE SCRUBBING FLOORS OR LIFTING AND MOVING HEAVY FURNITURE: NO
CAN YOU DO YARD WORK LIKE RAKING LEAVES, WEEDING OR PUSHING A MOWER: NO
DASI METS SCORE: 6.1
CAN YOU WALK INDOORS, SUCH AS AROUND YOUR HOUSE: YES
CAN YOU HAVE SEXUAL RELATIONS: YES
CAN YOU PARTICIPATE IN MODERATE RECREATIONAL ACTIVITIES LIKE GOLF, BOWLING, DANCING, DOUBLES TENNIS OR THROWING A BASEBALL OR FOOTBALL: YES
CAN YOU RUN A SHORT DISTANCE: NO
CAN YOU DO LIGHT WORK AROUND THE HOUSE LIKE DUSTING OR WASHING DISHES: YES
CAN YOU DO MODERATE WORK AROUND THE HOUSE LIKE VACUUMING, SWEEPING FLOORS OR CARRYING GROCERIES: YES
CAN YOU WALK A BLOCK OR TWO ON LEVEL GROUND: NO
TOTAL_SCORE: 27.45
CAN YOU PARTICIPATE IN STRENOUS SPORTS LIKE SWIMMING, SINGLES TENNIS, FOOTBALL, BASKETBALL, OR SKIING: NO
CAN YOU TAKE CARE OF YOURSELF (EAT, DRESS, BATHE, OR USE TOILET): YES
CAN YOU CLIMB A FLIGHT OF STAIRS OR WALK UP A HILL: YES

## 2024-06-03 ASSESSMENT — PATIENT HEALTH QUESTIONNAIRE - PHQ9: SUM OF ALL RESPONSES TO PHQ QUESTIONS 1-9: 3

## 2024-06-03 NOTE — PROGRESS NOTES
"  Vascular Rehabilitation  MID Evaluation    Name: Micah Beck  Medical Record Number: [id number]  YOB: 1960  Age: 64 y.o.    Today’s Date: 6/3/2024  Primary Care Physician: Peter Nguyen MD  Referring Physician: Monica Hron MD  Program Location: [facility]  Primary Diagnosis:   1. Atherosclerosis of native artery of right lower extremity with intermittent claudication (CMS-HCC)  Follow Up In Vascular Rehab         Onset/Date of Diagnosis: 2-    Date of First Session: 4/29/24    AACVPR Risk Stratification: Moderate     Falls Risk: Medium  Psychosocial Assessment     Pre PHQ-9: 3        Sent PH-Q 9 to MD if score > 20: No; score < 20    Pt reported/currently experiencing stress: No  Patient uses stress management skills: Yes     Currently seeing a mental health provider: No  Social Support: Yes, Whom: wife  Quality of Life Survey: SF-36   SF-36 Pre Post   Physical Component Score 22.96 TBD   Mental Component Score 54.23 TBD     Learning Assessment:  Learning assessment/barriers: None  Preferred learning method: Writing handout  Barriers: None  Comments:    Stages of Change:Action    Psychosocial Plan    Goal Status: In progress    Psychosocial Goals: Demonstrating proper techniques for stress management and Maintain or lower PH-Q 9 score by discharge    Psychosocial Interventions/Education:   Encourage patient to attend the stress management class.   Discussed making lifestyle changes during the behavior change class.         Nutrition Assessment:    Hyperlipidemia: Yes     Lipids:   No results found for: \"CHOL\", \"HDL\", \"LDLF\", \"TRIG\"    Current Dietary Guidelines:  regular diet  Barriers to dietary change: no    Diet Habit Survey: Picture Your Plate  Pre:  52  Post: To be done at discharge.    Diabetes Assessment    No results found for: \"HGBA1C\"    History of Diabetes: No    Weight Management    Height: 185.4 cm (6' 0.99\")  Weight: 97.3 kg (214 lb 6.4 oz)  BMI " (Calculated): 28.29      Nutrition Plan    Goal Status: In progress    Nutrition Goals: Improve Diet Habit Survey score by 5-10 points by discharge and Adapt a low-sodium, DASH diet prior to discharge    Nutrition Interventions/Education:   Patient attended class with the dietitian, discussed heart healthy eating tips, shopping tips, and nutrition labels.   Patient obtains his weight each session.         Exercise Assessment    Yes  Mode: treadmill  Frequency: 6x week  Duration: 10 min    Exercise Prescription     Exercise Prescription based on: Duke Activity Status Index (DASI) and Patient History    DASI Score: 27.45   MET Score: 6.1   Frequency:  3 days/week   Mode: Treadmill and NuStep   Duration: 30-40 total aerobic minutes   Intensity: RPE 11-14  Target HR:   105-115  MET Level: 2.1-2.6  Patient wears supplemental O2: No     Modality Workload METs Duration (minutes)   1 Pre-Exercise      2 Treadmill 2 mph  2.6 8 :00   3 Rest    10:00   4 Treadmill 2 mph  2.6 8 :00   5 Rest    10 :00   6 Treadmill 2 mph 2.6 8:00   7  NuStep 32 khan @ lvl 2 2.1 10:00   8 Post exercise        Resistance Training: No   Home Exercise Prescript-ion given: To be given prior to discharge from program.    Exercise Plan    Goal Status: Initial Assessment; goals not yet started    Exercise Goals: Increase exercise MET level by 5-10% each week, Increase total exercise duration to 30-45 minutes, and Obtain 150 minutes/week of moderate intensity aerobic exercise    Exercise Interventions/Education:   Patient attended class on aerobic exercise, discussed pulse taking, different types of aerobic exercise, and weather precautions.   Patient continues to exercise and increase on the treadmill to help PAD pain.       Other Core Components/Risk Factor Assessment:    Medication adherence  Current Medications:   Medication Documentation Review Audit       Reviewed by Yary Dowd RN (Registered Nurse) on 04/22/24 at 1617      Medication Order  Taking? Sig Documenting Provider Last Dose Status   aspirin 81 mg EC tablet [id number] Yes Take 1 tablet (81 mg) by mouth once daily. DEVI Mejia, WILLY Taking Active   atorvastatin (Lipitor) 40 mg tablet [id number] Yes Take 1 tablet (40 mg) by mouth once daily. DEVI Mejia, WILLY Taking Active                                 Medication compliance: Yes   Uses pill box/organizer: No    Carries medication list: No     Blood Pressure Management  History of Hypertension: Yes   Medication Changes: No   Resting BP:  Visit Vitals  /80          Heart Failure Management  Hx of Heart Failure: No    Smoking/Tobacco Assessment  Social History     Tobacco Use   Smoking Status Former    Types: Cigarettes   Smokeless Tobacco Never       Other Core Component Plan    Goal Status: In progress    Other Core Component Goals: Medication compliance, Verbalize medication usage and drug actions by discharge, Set tobacco cessation quit date while enrolled, and Achieve resting BP of < 130/80 by discharge    Other Core Component Interventions/Education:   Patient attended class on hypertension, discussed blood pressure numbers, medications, medication compliance, hypertension causes, and low sodium and DASH diet.   Patient's BP has been elevated at times without complaint and resolved with rest.         Individual Patient Goals:    Decrease pain in legs  Increase length of walking    Goal Status: In progress    Staff Comments:  Patient reports no angina with exercise. He has attended 12 sessions and is senior care through the program. Patient is completing 3 bouts of walking on the treadmill at 2 mph without incline. Patient is consistent and engaging in class with staff and classmates. The patient's blood pressure has been elevated at time but resolve with rest and SPO2 has been 93% or greater on room air. A physician notification was sent on 5/01/24. Patient reports after his treadmill sessions that his right leg  cramps then his leg goes numb. Patient was advised to go to the ER if ever any symptoms of loss of circulation to legs. Will continue with exercise and education as tolerated per RPE and pain scale.        Rehab Staff Signature: ADARSH COOK    EDUCATION  Aerobic exercise  Group diet  HTN  Behavior Change

## 2024-06-04 NOTE — PROGRESS NOTES
"  Vascular Rehabilitation  MID Evaluation    Name: Micah Beck  Medical Record Number: [id number]  YOB: 1960  Age: 64 y.o.    Today’s Date: 6/4/2024  Primary Care Physician: Peter Nguyen MD  Referring Physician: Monica Horn MD  Program Location: [facility]  Primary Diagnosis:   1. Atherosclerosis of native artery of right lower extremity with intermittent claudication (CMS-HCC)  Follow Up In Vascular Rehab         Onset/Date of Diagnosis: 2-    Date of First Session: 4/29/24    AACVPR Risk Stratification: Moderate     Falls Risk: Medium  Psychosocial Assessment     Pre PHQ-9: 3        Sent PH-Q 9 to MD if score > 20: No; score < 20    Pt reported/currently experiencing stress: No  Patient uses stress management skills: Yes     Currently seeing a mental health provider: No  Social Support: Yes, Whom: wife  Quality of Life Survey: SF-36   SF-36 Pre Post   Physical Component Score 22.96 TBD   Mental Component Score 54.23 TBD     Learning Assessment:  Learning assessment/barriers: None  Preferred learning method: Writing handout  Barriers: None  Comments:    Stages of Change:Action    Psychosocial Plan    Goal Status: In progress    Psychosocial Goals: Demonstrating proper techniques for stress management and Maintain or lower PH-Q 9 score by discharge    Psychosocial Interventions/Education:   Encourage patient to attend the stress management class.   Discussed making lifestyle changes during the behavior change class.         Nutrition Assessment:    Hyperlipidemia: Yes     Lipids:   No results found for: \"CHOL\", \"HDL\", \"LDLF\", \"TRIG\"    Current Dietary Guidelines:  regular diet  Barriers to dietary change: no    Diet Habit Survey: Picture Your Plate  Pre:  52  Post: To be done at discharge.    Diabetes Assessment    No results found for: \"HGBA1C\"    History of Diabetes: No    Weight Management    Height: 185.4 cm (6' 0.99\")  Weight: 97.3 kg (214 lb 6.4 oz)  BMI " (Calculated): 28.29      Nutrition Plan    Goal Status: In progress    Nutrition Goals: Improve Diet Habit Survey score by 5-10 points by discharge and Adapt a low-sodium, DASH diet prior to discharge    Nutrition Interventions/Education:   Patient attended class with the dietitian, discussed heart healthy eating tips, shopping tips, and nutrition labels.   Patient obtains his weight each session.         Exercise Assessment    Yes  Mode: treadmill  Frequency: 6x week  Duration: 10 min    Exercise Prescription     Exercise Prescription based on: Duke Activity Status Index (DASI) and Patient History    DASI Score: 27.45   MET Score: 6.1   Frequency:  3 days/week   Mode: Treadmill and NuStep   Duration: 30-40 total aerobic minutes   Intensity: RPE 11-14  Target HR:   105-115  MET Level: 2.1-2.6  Patient wears supplemental O2: No     Modality Workload METs Duration (minutes)   1 Pre-Exercise      2 Treadmill 2 mph  2.6 8 :00   3 Rest    10:00   4 Treadmill 2 mph  2.6 8 :00   5 Rest    10 :00   6 Treadmill 2 mph 2.6 8:00   7  NuStep 32 khan @ lvl 2 2.1 10:00   8 Post exercise        Resistance Training: No   Home Exercise Prescript-ion given: To be given prior to discharge from program.    Exercise Plan    Goal Status: Initial Assessment; goals not yet started    Exercise Goals: Increase exercise MET level by 5-10% each week, Increase total exercise duration to 30-45 minutes, and Obtain 150 minutes/week of moderate intensity aerobic exercise    Exercise Interventions/Education:   Patient attended class on aerobic exercise, discussed pulse taking, different types of aerobic exercise, and weather precautions.   Patient continues to exercise and increase on the treadmill to help PAD pain.       Other Core Components/Risk Factor Assessment:    Medication adherence  Current Medications:   Medication Documentation Review Audit       Reviewed by Yary Dowd RN (Registered Nurse) on 04/22/24 at 1617      Medication Order  Taking? Sig Documenting Provider Last Dose Status   aspirin 81 mg EC tablet [id number] Yes Take 1 tablet (81 mg) by mouth once daily. DEVI Mejia, WILLY Taking Active   atorvastatin (Lipitor) 40 mg tablet [id number] Yes Take 1 tablet (40 mg) by mouth once daily. DEVI Mejia, WILLY Taking Active                                 Medication compliance: Yes   Uses pill box/organizer: No    Carries medication list: No     Blood Pressure Management  History of Hypertension: Yes   Medication Changes: No   Resting BP:  Visit Vitals  /80          Heart Failure Management  Hx of Heart Failure: No    Smoking/Tobacco Assessment  Social History     Tobacco Use   Smoking Status Former    Types: Cigarettes   Smokeless Tobacco Never       Other Core Component Plan    Goal Status: In progress    Other Core Component Goals: Medication compliance, Verbalize medication usage and drug actions by discharge, Set tobacco cessation quit date while enrolled, and Achieve resting BP of < 130/80 by discharge    Other Core Component Interventions/Education:   Patient attended class on hypertension, discussed blood pressure numbers, medications, medication compliance, hypertension causes, and low sodium and DASH diet.   Patient's BP has been elevated at times without complaint and resolved with rest.         Individual Patient Goals:    Decrease pain in legs  Increase length of walking    Goal Status: In progress    Staff Comments:  Patient reports no angina with exercise. He has attended 12 sessions and is long term through the program. Patient is completing 3 bouts of walking on the treadmill at 2 mph without incline. Patient is consistent and engaging in class with staff and classmates. The patient's blood pressure has been elevated at time but resolve with rest and SPO2 has been 93% or greater on room air. A physician notification was sent on 5/01/24. Patient reports after his treadmill sessions that his right leg  cramps then his leg goes numb. Patient was advised to go to the ER if ever any symptoms of loss of circulation to legs. Will continue with exercise and education as tolerated per RPE and pain scale.        Rehab Staff Signature: Anay Quinones RN    EDUCATION  Aerobic exercise  Group diet  HTN  Behavior Change

## 2024-06-07 ENCOUNTER — CLINICAL SUPPORT (OUTPATIENT)
Dept: CARDIAC REHAB | Facility: HOSPITAL | Age: 64
End: 2024-06-07
Payer: COMMERCIAL

## 2024-06-07 DIAGNOSIS — I70.211 ATHEROSCLEROSIS OF NATIVE ARTERY OF RIGHT LOWER EXTREMITY WITH INTERMITTENT CLAUDICATION (CMS-HCC): ICD-10-CM

## 2024-06-07 PROCEDURE — 93668 PERIPHERAL VASCULAR REHAB: CPT | Performed by: INTERNAL MEDICINE

## 2024-06-10 ENCOUNTER — CLINICAL SUPPORT (OUTPATIENT)
Dept: CARDIAC REHAB | Facility: HOSPITAL | Age: 64
End: 2024-06-10
Payer: COMMERCIAL

## 2024-06-10 DIAGNOSIS — I70.211 ATHEROSCLEROSIS OF NATIVE ARTERY OF RIGHT LOWER EXTREMITY WITH INTERMITTENT CLAUDICATION (CMS-HCC): ICD-10-CM

## 2024-06-10 PROCEDURE — 93668 PERIPHERAL VASCULAR REHAB: CPT | Performed by: INTERNAL MEDICINE

## 2024-06-12 ENCOUNTER — APPOINTMENT (OUTPATIENT)
Dept: CARDIAC REHAB | Facility: HOSPITAL | Age: 64
End: 2024-06-12
Payer: COMMERCIAL

## 2024-06-14 ENCOUNTER — CLINICAL SUPPORT (OUTPATIENT)
Dept: CARDIAC REHAB | Facility: HOSPITAL | Age: 64
End: 2024-06-14
Payer: COMMERCIAL

## 2024-06-14 DIAGNOSIS — I70.211 ATHEROSCLEROSIS OF NATIVE ARTERY OF RIGHT LOWER EXTREMITY WITH INTERMITTENT CLAUDICATION (CMS-HCC): ICD-10-CM

## 2024-06-14 PROCEDURE — 93668 PERIPHERAL VASCULAR REHAB: CPT | Performed by: INTERNAL MEDICINE

## 2024-06-17 ENCOUNTER — APPOINTMENT (OUTPATIENT)
Dept: CARDIAC REHAB | Facility: HOSPITAL | Age: 64
End: 2024-06-17
Payer: COMMERCIAL

## 2024-06-19 ENCOUNTER — APPOINTMENT (OUTPATIENT)
Dept: CARDIAC REHAB | Facility: HOSPITAL | Age: 64
End: 2024-06-19
Payer: COMMERCIAL

## 2024-06-21 ENCOUNTER — APPOINTMENT (OUTPATIENT)
Dept: CARDIAC REHAB | Facility: HOSPITAL | Age: 64
End: 2024-06-21
Payer: COMMERCIAL

## 2024-06-24 ENCOUNTER — CLINICAL SUPPORT (OUTPATIENT)
Dept: CARDIAC REHAB | Facility: HOSPITAL | Age: 64
End: 2024-06-24
Payer: COMMERCIAL

## 2024-06-24 DIAGNOSIS — I70.211 ATHEROSCLEROSIS OF NATIVE ARTERY OF RIGHT LOWER EXTREMITY WITH INTERMITTENT CLAUDICATION (CMS-HCC): ICD-10-CM

## 2024-06-24 PROCEDURE — 93668 PERIPHERAL VASCULAR REHAB: CPT | Performed by: INTERNAL MEDICINE

## 2024-06-26 ENCOUNTER — APPOINTMENT (OUTPATIENT)
Dept: CARDIAC REHAB | Facility: HOSPITAL | Age: 64
End: 2024-06-26
Payer: COMMERCIAL

## 2024-06-28 ENCOUNTER — CLINICAL SUPPORT (OUTPATIENT)
Dept: CARDIAC REHAB | Facility: HOSPITAL | Age: 64
End: 2024-06-28
Payer: COMMERCIAL

## 2024-06-28 DIAGNOSIS — I70.211 ATHEROSCLEROSIS OF NATIVE ARTERY OF RIGHT LOWER EXTREMITY WITH INTERMITTENT CLAUDICATION (CMS-HCC): ICD-10-CM

## 2024-06-28 PROCEDURE — 93668 PERIPHERAL VASCULAR REHAB: CPT | Performed by: INTERNAL MEDICINE

## 2024-07-01 ENCOUNTER — APPOINTMENT (OUTPATIENT)
Dept: CARDIAC REHAB | Facility: HOSPITAL | Age: 64
End: 2024-07-01
Payer: COMMERCIAL

## 2024-07-03 ENCOUNTER — CLINICAL SUPPORT (OUTPATIENT)
Dept: CARDIAC REHAB | Facility: HOSPITAL | Age: 64
End: 2024-07-03
Payer: COMMERCIAL

## 2024-07-03 DIAGNOSIS — I70.211 ATHEROSCLEROSIS OF NATIVE ARTERY OF RIGHT LOWER EXTREMITY WITH INTERMITTENT CLAUDICATION (CMS-HCC): ICD-10-CM

## 2024-07-03 PROCEDURE — 93668 PERIPHERAL VASCULAR REHAB: CPT | Performed by: INTERNAL MEDICINE

## 2024-07-05 ENCOUNTER — APPOINTMENT (OUTPATIENT)
Dept: CARDIAC REHAB | Facility: HOSPITAL | Age: 64
End: 2024-07-05
Payer: COMMERCIAL

## 2024-07-10 ENCOUNTER — CLINICAL SUPPORT (OUTPATIENT)
Dept: CARDIAC REHAB | Facility: HOSPITAL | Age: 64
End: 2024-07-10
Payer: COMMERCIAL

## 2024-07-10 DIAGNOSIS — I70.211 ATHEROSCLEROSIS OF NATIVE ARTERY OF RIGHT LOWER EXTREMITY WITH INTERMITTENT CLAUDICATION (CMS-HCC): ICD-10-CM

## 2024-07-10 PROCEDURE — 93668 PERIPHERAL VASCULAR REHAB: CPT | Performed by: INTERNAL MEDICINE

## 2024-07-15 ENCOUNTER — CLINICAL SUPPORT (OUTPATIENT)
Dept: CARDIAC REHAB | Facility: HOSPITAL | Age: 64
End: 2024-07-15
Payer: COMMERCIAL

## 2024-07-15 DIAGNOSIS — I70.211 ATHEROSCLEROSIS OF NATIVE ARTERY OF RIGHT LOWER EXTREMITY WITH INTERMITTENT CLAUDICATION (CMS-HCC): ICD-10-CM

## 2024-07-15 PROCEDURE — 93668 PERIPHERAL VASCULAR REHAB: CPT | Performed by: INTERNAL MEDICINE

## 2024-07-19 ENCOUNTER — CLINICAL SUPPORT (OUTPATIENT)
Dept: CARDIAC REHAB | Facility: HOSPITAL | Age: 64
End: 2024-07-19
Payer: COMMERCIAL

## 2024-07-19 DIAGNOSIS — I70.211 ATHEROSCLEROSIS OF NATIVE ARTERY OF RIGHT LOWER EXTREMITY WITH INTERMITTENT CLAUDICATION (CMS-HCC): ICD-10-CM

## 2024-07-19 PROCEDURE — 93668 PERIPHERAL VASCULAR REHAB: CPT | Performed by: INTERNAL MEDICINE

## 2024-07-22 ENCOUNTER — CLINICAL SUPPORT (OUTPATIENT)
Dept: CARDIAC REHAB | Facility: HOSPITAL | Age: 64
End: 2024-07-22
Payer: COMMERCIAL

## 2024-07-22 DIAGNOSIS — I70.211 ATHEROSCLEROSIS OF NATIVE ARTERY OF RIGHT LOWER EXTREMITY WITH INTERMITTENT CLAUDICATION (CMS-HCC): ICD-10-CM

## 2024-07-22 PROCEDURE — 93668 PERIPHERAL VASCULAR REHAB: CPT | Performed by: INTERNAL MEDICINE

## 2024-07-24 ENCOUNTER — APPOINTMENT (OUTPATIENT)
Dept: CARDIAC REHAB | Facility: HOSPITAL | Age: 64
End: 2024-07-24
Payer: COMMERCIAL

## 2024-07-26 ENCOUNTER — APPOINTMENT (OUTPATIENT)
Dept: CARDIAC REHAB | Facility: HOSPITAL | Age: 64
End: 2024-07-26
Payer: COMMERCIAL

## 2024-07-29 ENCOUNTER — APPOINTMENT (OUTPATIENT)
Dept: CARDIAC REHAB | Facility: HOSPITAL | Age: 64
End: 2024-07-29
Payer: COMMERCIAL

## 2024-07-31 ENCOUNTER — APPOINTMENT (OUTPATIENT)
Dept: CARDIAC REHAB | Facility: HOSPITAL | Age: 64
End: 2024-07-31
Payer: COMMERCIAL

## 2024-08-02 ENCOUNTER — APPOINTMENT (OUTPATIENT)
Dept: CARDIAC REHAB | Facility: HOSPITAL | Age: 64
End: 2024-08-02
Payer: COMMERCIAL

## 2024-08-02 ENCOUNTER — OFFICE VISIT (OUTPATIENT)
Dept: PRIMARY CARE | Facility: CLINIC | Age: 64
End: 2024-08-02
Payer: COMMERCIAL

## 2024-08-02 VITALS
HEART RATE: 80 BPM | SYSTOLIC BLOOD PRESSURE: 128 MMHG | HEIGHT: 73 IN | BODY MASS INDEX: 28.02 KG/M2 | WEIGHT: 211.4 LBS | OXYGEN SATURATION: 95 % | DIASTOLIC BLOOD PRESSURE: 84 MMHG

## 2024-08-02 DIAGNOSIS — M54.2 NECK PAIN: ICD-10-CM

## 2024-08-02 DIAGNOSIS — I73.9 CLAUDICATION (CMS-HCC): ICD-10-CM

## 2024-08-02 DIAGNOSIS — F32.0 CURRENT MILD EPISODE OF MAJOR DEPRESSIVE DISORDER WITHOUT PRIOR EPISODE (CMS-HCC): Primary | ICD-10-CM

## 2024-08-02 PROCEDURE — 99214 OFFICE O/P EST MOD 30 MIN: CPT | Performed by: FAMILY MEDICINE

## 2024-08-02 PROCEDURE — 1159F MED LIST DOCD IN RCRD: CPT | Performed by: FAMILY MEDICINE

## 2024-08-02 PROCEDURE — 3008F BODY MASS INDEX DOCD: CPT | Performed by: FAMILY MEDICINE

## 2024-08-02 PROCEDURE — 1160F RVW MEDS BY RX/DR IN RCRD: CPT | Performed by: FAMILY MEDICINE

## 2024-08-02 ASSESSMENT — ENCOUNTER SYMPTOMS
FATIGUE: 0
DIFFICULTY URINATING: 0
WEAKNESS: 0
ADENOPATHY: 0
ABDOMINAL PAIN: 0
NUMBNESS: 0
ACTIVITY CHANGE: 0
BLOOD IN STOOL: 0
DYSPHORIC MOOD: 0
DIARRHEA: 0
EYE DISCHARGE: 0
CONSTIPATION: 0
NECK PAIN: 0
DIZZINESS: 0
NERVOUS/ANXIOUS: 0
VOMITING: 0
COUGH: 0
HEMATURIA: 0
ARTHRALGIAS: 0
BACK PAIN: 0
HEADACHES: 0
SORE THROAT: 0
NAUSEA: 0
MYALGIAS: 0
CHEST TIGHTNESS: 0
BRUISES/BLEEDS EASILY: 0
SHORTNESS OF BREATH: 0

## 2024-08-02 NOTE — PROGRESS NOTES
"Subjective   Patient ID: Micah Beck is a 64 y.o. male who presents for Follow-up.  HPI    Would like a referral for vascular surgery  Positive claudication  Needs further evaluation    Depressed mood stable  No suicidal ideation no psychotic or manic symptoms    Neck pain stable  No progression or worsening  No red flag symptoms noted      Review of Systems   Constitutional:  Negative for activity change and fatigue.   HENT:  Negative for congestion and sore throat.    Eyes:  Negative for discharge.   Respiratory:  Negative for cough, chest tightness and shortness of breath.    Cardiovascular:  Negative for chest pain and leg swelling.   Gastrointestinal:  Negative for abdominal pain, blood in stool, constipation, diarrhea, nausea and vomiting.   Endocrine: Negative for cold intolerance and heat intolerance.   Genitourinary:  Negative for difficulty urinating and hematuria.   Musculoskeletal:  Negative for arthralgias, back pain, gait problem, myalgias and neck pain.   Allergic/Immunologic: Negative for environmental allergies.   Neurological:  Negative for dizziness, syncope, weakness, numbness and headaches.   Hematological:  Negative for adenopathy. Does not bruise/bleed easily.   Psychiatric/Behavioral:  Negative for dysphoric mood. The patient is not nervous/anxious.    All other systems reviewed and are negative.      Objective   /84 (BP Location: Left arm, BP Cuff Size: Large adult)   Pulse 80   Ht 1.854 m (6' 0.99\")   Wt 95.9 kg (211 lb 6.4 oz)   SpO2 95%   BMI 27.90 kg/m²    Physical Exam  Vitals and nursing note reviewed.   Constitutional:       General: He is not in acute distress.     Appearance: Normal appearance.   HENT:      Head: Normocephalic and atraumatic.      Right Ear: Tympanic membrane, ear canal and external ear normal.      Left Ear: Tympanic membrane, ear canal and external ear normal.      Nose: Nose normal.      Mouth/Throat:      Mouth: Mucous membranes are moist.      " Pharynx: Oropharynx is clear. No oropharyngeal exudate or posterior oropharyngeal erythema.   Eyes:      Extraocular Movements: Extraocular movements intact.      Conjunctiva/sclera: Conjunctivae normal.      Pupils: Pupils are equal, round, and reactive to light.   Cardiovascular:      Rate and Rhythm: Normal rate and regular rhythm.      Pulses: Normal pulses.      Heart sounds: Normal heart sounds. No murmur heard.  Pulmonary:      Effort: Pulmonary effort is normal. No respiratory distress.      Breath sounds: Normal breath sounds. No wheezing or rales.   Abdominal:      General: Abdomen is flat. Bowel sounds are normal. There is no distension.      Palpations: Abdomen is soft. There is no mass.      Tenderness: There is no abdominal tenderness.   Musculoskeletal:         General: No swelling or deformity. Normal range of motion.      Cervical back: Normal range of motion and neck supple.      Right lower leg: No edema.      Left lower leg: No edema.   Lymphadenopathy:      Cervical: No cervical adenopathy.   Skin:     General: Skin is warm and dry.      Capillary Refill: Capillary refill takes less than 2 seconds.      Findings: No lesion or rash.   Neurological:      General: No focal deficit present.      Mental Status: He is alert and oriented to person, place, and time.      Cranial Nerves: No cranial nerve deficit.      Motor: No weakness.   Psychiatric:         Mood and Affect: Mood normal.         Behavior: Behavior normal.         Thought Content: Thought content normal.         Judgment: Judgment normal.         Assessment/Plan   Problem List Items Addressed This Visit       Current mild episode of major depressive disorder without prior episode (CMS-HCC) - Primary    Neck pain    Claudication (CMS-HCC)    Relevant Orders    Follow Up In Advanced Primary Care - PCP    Referral to Vascular Surgery       Patient education provided.  Stay current with age appropriate health maintenance as  instructed.  Appointment here or ER with new or worsening symptoms'  Keep appropriate follow-up visit.  Stay current with proper immunizations   Report suicidal ideation  Report psychotic or manic symptoms  Refer for testing  Recheck 6 months and as needed

## 2024-08-05 ENCOUNTER — APPOINTMENT (OUTPATIENT)
Dept: CARDIAC REHAB | Facility: HOSPITAL | Age: 64
End: 2024-08-05
Payer: COMMERCIAL

## 2024-10-10 ENCOUNTER — OFFICE VISIT (OUTPATIENT)
Dept: VASCULAR SURGERY | Facility: CLINIC | Age: 64
End: 2024-10-10
Payer: COMMERCIAL

## 2024-10-10 VITALS
WEIGHT: 211 LBS | SYSTOLIC BLOOD PRESSURE: 134 MMHG | BODY MASS INDEX: 27.85 KG/M2 | TEMPERATURE: 97.6 F | DIASTOLIC BLOOD PRESSURE: 73 MMHG | HEART RATE: 74 BPM

## 2024-10-10 DIAGNOSIS — I73.9 CLAUDICATION (CMS-HCC): ICD-10-CM

## 2024-10-10 DIAGNOSIS — I73.9 CLAUDICATION IN PERIPHERAL VASCULAR DISEASE (CMS-HCC): ICD-10-CM

## 2024-10-10 PROCEDURE — 99214 OFFICE O/P EST MOD 30 MIN: CPT | Performed by: SURGERY

## 2024-10-10 ASSESSMENT — PAIN SCALES - GENERAL: PAINLEVEL: 0-NO PAIN

## 2024-10-10 NOTE — PROGRESS NOTES
Vascular Surgery Clinic Note    CC: claudication    HPI:  Micah Beck is 64 y.o. male with history of smoking (down to 3-4 cig/week). He is the  for [employer]. He can walk 80 yards and then gets right calf claudication. No rest pain/wounds.     Medical History:   has no past medical history on file.    Meds:   Current Outpatient Medications on File Prior to Visit   Medication Sig Dispense Refill    aspirin 81 mg EC tablet Take 1 tablet (81 mg) by mouth once daily. 30 tablet 11    atorvastatin (Lipitor) 40 mg tablet Take 1 tablet (40 mg) by mouth once daily. 90 each 3     No current facility-administered medications on file prior to visit.        Allergies:   No Known Allergies    SH:    Social Drivers of Health     Tobacco Use: Medium Risk (10/10/2024)    Patient History     Smoking Tobacco Use: Former     Smokeless Tobacco Use: Never     Passive Exposure: Not on file   Alcohol Use: Not on file   Financial Resource Strain: Not on file   Food Insecurity: Not on file   Transportation Needs: Not on file   Physical Activity: Not on file   Stress: Not on file   Social Connections: Not on file   Intimate Partner Violence: Not on file   Depression: Not at risk (4/22/2024)    PHQ-2     PHQ-2 Score: 0   Housing Stability: Not on file   Utilities: Not on file   Digital Equity: Not on file   Health Literacy: Not on file        FH:  Family History   Problem Relation Name Age of Onset    Cancer Father          ROS:  All systems were reviewed and are negative except as per HPI.    Objective:  Vitals:  Vitals:    10/10/24 1304   BP: 134/73   Pulse: 74   Temp: 36.4 °C (97.6 °F)        Exam:  In NAD, well appearing  Abd Soft, ND/NT  Vascular examination:  No right pedal pulse, strong left PT/DP  Feet are warm, no wounds    Assessment & Plan:  Micah Beck is 64 y.o. male with right leg claudication and HENRY 0.8. I recommended medical therapy. RTC yearly for HENRY.      I spent a total of 30 minutes on the day of  the visit.         Shyam Eisenberg M.D.

## 2025-02-03 ENCOUNTER — APPOINTMENT (OUTPATIENT)
Dept: PRIMARY CARE | Facility: CLINIC | Age: 65
End: 2025-02-03
Payer: COMMERCIAL

## 2025-02-26 ENCOUNTER — TELEPHONE (OUTPATIENT)
Dept: VASCULAR SURGERY | Facility: HOSPITAL | Age: 65
End: 2025-02-26
Payer: COMMERCIAL

## 2025-02-26 NOTE — TELEPHONE ENCOUNTER
I have attempted to contact    Mr Beck    . There is no answer at the following phone number   [phone] . I have left a voice mail message for the patient to contact Dr. Eisenberg's office nurse at [phone]     Patient scheduled for HENRY studies to assess the blood flow to your lower extremities 10/7/2025 @ 3 PM  @ EM  office  visit 10/16/2025 @9:30 AM  [facility].   Marguerite Burns RN BSN

## 2025-02-28 ENCOUNTER — APPOINTMENT (OUTPATIENT)
Dept: CARDIOLOGY | Facility: HOSPITAL | Age: 65
End: 2025-02-28
Payer: COMMERCIAL

## 2025-02-28 ENCOUNTER — APPOINTMENT (OUTPATIENT)
Dept: RADIOLOGY | Facility: HOSPITAL | Age: 65
End: 2025-02-28
Payer: COMMERCIAL

## 2025-02-28 ENCOUNTER — HOSPITAL ENCOUNTER (EMERGENCY)
Facility: HOSPITAL | Age: 65
Discharge: HOME | End: 2025-02-28
Attending: STUDENT IN AN ORGANIZED HEALTH CARE EDUCATION/TRAINING PROGRAM
Payer: COMMERCIAL

## 2025-02-28 VITALS
SYSTOLIC BLOOD PRESSURE: 148 MMHG | HEIGHT: 74 IN | DIASTOLIC BLOOD PRESSURE: 90 MMHG | OXYGEN SATURATION: 97 % | HEART RATE: 80 BPM | WEIGHT: 200 LBS | BODY MASS INDEX: 25.67 KG/M2 | TEMPERATURE: 99 F | RESPIRATION RATE: 16 BRPM

## 2025-02-28 DIAGNOSIS — R06.02 SHORTNESS OF BREATH: Primary | ICD-10-CM

## 2025-02-28 LAB
ALBUMIN SERPL BCP-MCNC: 4.5 G/DL (ref 3.4–5)
ALP SERPL-CCNC: 80 U/L (ref 33–136)
ALT SERPL W P-5'-P-CCNC: 13 U/L (ref 10–52)
ANION GAP SERPL CALC-SCNC: 14 MMOL/L (ref 10–20)
AST SERPL W P-5'-P-CCNC: 12 U/L (ref 9–39)
ATRIAL RATE: 77 BPM
ATRIAL RATE: 92 BPM
BASOPHILS # BLD AUTO: 0.08 X10*3/UL (ref 0–0.1)
BASOPHILS NFR BLD AUTO: 0.8 %
BILIRUB SERPL-MCNC: 0.4 MG/DL (ref 0–1.2)
BUN SERPL-MCNC: 9 MG/DL (ref 6–23)
CALCIUM SERPL-MCNC: 9.5 MG/DL (ref 8.6–10.3)
CARDIAC TROPONIN I PNL SERPL HS: 10 NG/L (ref 0–20)
CARDIAC TROPONIN I PNL SERPL HS: 7 NG/L (ref 0–20)
CHLORIDE SERPL-SCNC: 105 MMOL/L (ref 98–107)
CO2 SERPL-SCNC: 23 MMOL/L (ref 21–32)
CREAT SERPL-MCNC: 0.92 MG/DL (ref 0.5–1.3)
EGFRCR SERPLBLD CKD-EPI 2021: >90 ML/MIN/1.73M*2
EOSINOPHIL # BLD AUTO: 0.04 X10*3/UL (ref 0–0.7)
EOSINOPHIL NFR BLD AUTO: 0.4 %
ERYTHROCYTE [DISTWIDTH] IN BLOOD BY AUTOMATED COUNT: 13.2 % (ref 11.5–14.5)
FLUAV RNA RESP QL NAA+PROBE: NOT DETECTED
FLUBV RNA RESP QL NAA+PROBE: NOT DETECTED
GLUCOSE SERPL-MCNC: 94 MG/DL (ref 74–99)
HCT VFR BLD AUTO: 43.8 % (ref 41–52)
HGB BLD-MCNC: 14.7 G/DL (ref 13.5–17.5)
IMM GRANULOCYTES # BLD AUTO: 0.02 X10*3/UL (ref 0–0.7)
IMM GRANULOCYTES NFR BLD AUTO: 0.2 % (ref 0–0.9)
LYMPHOCYTES # BLD AUTO: 3.19 X10*3/UL (ref 1.2–4.8)
LYMPHOCYTES NFR BLD AUTO: 31.7 %
MAGNESIUM SERPL-MCNC: 1.63 MG/DL (ref 1.6–2.4)
MCH RBC QN AUTO: 30.4 PG (ref 26–34)
MCHC RBC AUTO-ENTMCNC: 33.6 G/DL (ref 32–36)
MCV RBC AUTO: 91 FL (ref 80–100)
MONOCYTES # BLD AUTO: 0.48 X10*3/UL (ref 0.1–1)
MONOCYTES NFR BLD AUTO: 4.8 %
NEUTROPHILS # BLD AUTO: 6.24 X10*3/UL (ref 1.2–7.7)
NEUTROPHILS NFR BLD AUTO: 62.1 %
NRBC BLD-RTO: 0 /100 WBCS (ref 0–0)
P AXIS: -2 DEGREES
P AXIS: 47 DEGREES
P OFFSET: 176 MS
P OFFSET: 198 MS
P ONSET: 153 MS
P ONSET: 156 MS
PLATELET # BLD AUTO: 283 X10*3/UL (ref 150–450)
POTASSIUM SERPL-SCNC: 3.9 MMOL/L (ref 3.5–5.3)
PR INTERVAL: 132 MS
PR INTERVAL: 150 MS
PROT SERPL-MCNC: 7.3 G/DL (ref 6.4–8.2)
Q ONSET: 222 MS
Q ONSET: 228 MS
QRS COUNT: 13 BEATS
QRS COUNT: 15 BEATS
QRS DURATION: 80 MS
QRS DURATION: 88 MS
QT INTERVAL: 370 MS
QT INTERVAL: 376 MS
QTC CALCULATION(BAZETT): 425 MS
QTC CALCULATION(BAZETT): 457 MS
QTC FREDERICIA: 408 MS
QTC FREDERICIA: 426 MS
R AXIS: -11 DEGREES
R AXIS: -21 DEGREES
RBC # BLD AUTO: 4.84 X10*6/UL (ref 4.5–5.9)
SARS-COV-2 RNA RESP QL NAA+PROBE: NOT DETECTED
SODIUM SERPL-SCNC: 138 MMOL/L (ref 136–145)
T AXIS: 33 DEGREES
T AXIS: 34 DEGREES
T OFFSET: 407 MS
T OFFSET: 416 MS
VENTRICULAR RATE: 77 BPM
VENTRICULAR RATE: 92 BPM
WBC # BLD AUTO: 10.1 X10*3/UL (ref 4.4–11.3)

## 2025-02-28 PROCEDURE — 99285 EMERGENCY DEPT VISIT HI MDM: CPT | Mod: 25 | Performed by: STUDENT IN AN ORGANIZED HEALTH CARE EDUCATION/TRAINING PROGRAM

## 2025-02-28 PROCEDURE — 93005 ELECTROCARDIOGRAM TRACING: CPT

## 2025-02-28 PROCEDURE — 71045 X-RAY EXAM CHEST 1 VIEW: CPT | Mod: FOREIGN READ | Performed by: RADIOLOGY

## 2025-02-28 PROCEDURE — 83735 ASSAY OF MAGNESIUM: CPT | Performed by: STUDENT IN AN ORGANIZED HEALTH CARE EDUCATION/TRAINING PROGRAM

## 2025-02-28 PROCEDURE — 71045 X-RAY EXAM CHEST 1 VIEW: CPT

## 2025-02-28 PROCEDURE — 36415 COLL VENOUS BLD VENIPUNCTURE: CPT | Performed by: STUDENT IN AN ORGANIZED HEALTH CARE EDUCATION/TRAINING PROGRAM

## 2025-02-28 PROCEDURE — 84484 ASSAY OF TROPONIN QUANT: CPT | Performed by: STUDENT IN AN ORGANIZED HEALTH CARE EDUCATION/TRAINING PROGRAM

## 2025-02-28 PROCEDURE — 80053 COMPREHEN METABOLIC PANEL: CPT | Performed by: STUDENT IN AN ORGANIZED HEALTH CARE EDUCATION/TRAINING PROGRAM

## 2025-02-28 PROCEDURE — 85025 COMPLETE CBC W/AUTO DIFF WBC: CPT | Performed by: STUDENT IN AN ORGANIZED HEALTH CARE EDUCATION/TRAINING PROGRAM

## 2025-02-28 PROCEDURE — 87636 SARSCOV2 & INF A&B AMP PRB: CPT | Performed by: STUDENT IN AN ORGANIZED HEALTH CARE EDUCATION/TRAINING PROGRAM

## 2025-02-28 ASSESSMENT — PAIN - FUNCTIONAL ASSESSMENT: PAIN_FUNCTIONAL_ASSESSMENT: 0-10

## 2025-02-28 ASSESSMENT — COLUMBIA-SUICIDE SEVERITY RATING SCALE - C-SSRS
6. HAVE YOU EVER DONE ANYTHING, STARTED TO DO ANYTHING, OR PREPARED TO DO ANYTHING TO END YOUR LIFE?: NO
2. HAVE YOU ACTUALLY HAD ANY THOUGHTS OF KILLING YOURSELF?: NO
1. IN THE PAST MONTH, HAVE YOU WISHED YOU WERE DEAD OR WISHED YOU COULD GO TO SLEEP AND NOT WAKE UP?: NO

## 2025-02-28 ASSESSMENT — LIFESTYLE VARIABLES
EVER FELT BAD OR GUILTY ABOUT YOUR DRINKING: NO
TOTAL SCORE: 0
HAVE YOU EVER FELT YOU SHOULD CUT DOWN ON YOUR DRINKING: NO
EVER HAD A DRINK FIRST THING IN THE MORNING TO STEADY YOUR NERVES TO GET RID OF A HANGOVER: NO
HAVE PEOPLE ANNOYED YOU BY CRITICIZING YOUR DRINKING: NO

## 2025-02-28 ASSESSMENT — PAIN SCALES - GENERAL
PAINLEVEL_OUTOF10: 0 - NO PAIN

## 2025-02-28 NOTE — ED PROVIDER NOTES
"HPI   Chief Complaint   Patient presents with    Shortness of Breath     \"Here for shortness of breath that started at work moving pallets.  Got better by the time the ambulance came but was shaky.\"  \"Had real bad cramp in the leg that I have the stents in.\"       Patient is 64-year-old male with history of hyperlipidemia peripheral vascular disease who presents to the emergency department for complaints of feeling weak and shaky and having trouble breathing while he was moving pallets at work.  Patient states that he symptoms have resolved and he currently has no pain or complaints.  He denies any fevers, chills, chest pain, productive cough, abdominal pain, nausea, vomiting, diarrhea, back pain, syncopal episodes, falls or traumatic injuries.      History provided by:  Patient and medical records          Patient History   Past Medical History:   Diagnosis Date    High cholesterol      Past Surgical History:   Procedure Laterality Date    OTHER SURGICAL HISTORY  03/10/2020    Knee surgery    US ASPIRATION INJECTION INTERMEDIATE JOINT  3/10/2020    US ASPIRATION INJECTION INTERMEDIATE JOINT 3/10/2020 [facility]     Family History   Problem Relation Name Age of Onset    Cancer Father       Social History     Tobacco Use    Smoking status: Every Day     Types: Cigarettes    Smokeless tobacco: Never   Vaping Use    Vaping status: Never Used   Substance Use Topics    Alcohol use: Never    Drug use: Never       Physical Exam   ED Triage Vitals [02/28/25 1308]   Temp Pulse Resp BP   -- -- -- --      SpO2 Temp Source Heart Rate Source Patient Position   -- Temporal Monitor Sitting      BP Location FiO2 (%)     Right arm --       Physical Exam  Vitals and nursing note reviewed.   Constitutional:       General: He is not in acute distress.     Appearance: Normal appearance. He is not ill-appearing, toxic-appearing or diaphoretic.   HENT:      Head: Normocephalic and atraumatic.      Nose: Nose normal.      " Mouth/Throat:      Mouth: Mucous membranes are moist.      Pharynx: No oropharyngeal exudate or posterior oropharyngeal erythema.   Eyes:      General: No scleral icterus.     Extraocular Movements: Extraocular movements intact.      Pupils: Pupils are equal, round, and reactive to light.   Cardiovascular:      Rate and Rhythm: Normal rate and regular rhythm.      Pulses: Normal pulses.      Heart sounds: Normal heart sounds. No murmur heard.     No friction rub. No gallop.   Pulmonary:      Effort: Pulmonary effort is normal. No respiratory distress.      Breath sounds: Normal breath sounds. No stridor. No wheezing, rhonchi or rales.   Chest:      Chest wall: No tenderness.   Abdominal:      General: Abdomen is flat. There is no distension.      Palpations: Abdomen is soft. There is no mass.      Tenderness: There is no abdominal tenderness. There is no guarding.      Hernia: No hernia is present.   Musculoskeletal:         General: No swelling, tenderness, deformity or signs of injury. Normal range of motion.      Cervical back: Normal range of motion and neck supple. No rigidity.   Skin:     General: Skin is warm and dry.      Capillary Refill: Capillary refill takes less than 2 seconds.      Coloration: Skin is not jaundiced or pale.      Findings: No bruising, erythema, lesion or rash.   Neurological:      General: No focal deficit present.      Mental Status: He is alert and oriented to person, place, and time. Mental status is at baseline.   Psychiatric:         Mood and Affect: Mood normal.         Behavior: Behavior normal.           ED Course & MDM   Diagnoses as of 02/28/25 1508   Shortness of breath                 No data recorded     Nehemias Coma Scale Score: 15 (02/28/25 1323 : Saima Holder RN)                           Medical Decision Making  Patient is a 64-year-old male presenting to the emergency department for complaints of shortness of breath that self resolved prior to coming to the emergency  department.  Cardiac evaluation was ordered.    Labs reviewed with no concerning findings.  COVID and flu were negative.  Troponin was negative.  Patient had no recurrence of his symptoms while in the emergency department.  He remained hemodynamically stable.  Chest x-ray had no acute cardiopulmonary processes.  EKG nonischemic.  Patient was advised of his laboratory and imaging findings and instructed follow-up with his primary care doctor.  Return precautions were given.  All questions were answered.  Patient was appreciative care and agreeable to discharge.    Amount and/or Complexity of Data Reviewed  Labs: ordered. Decision-making details documented in ED Course.  Radiology: ordered. Decision-making details documented in ED Course.  ECG/medicine tests: independent interpretation performed.     Details: 1315 hrs.: Sinus rhythm with ventricular rate of 92 bpm.  QRS Emmel 88 ms.  QTc 457 ms.  No acute ischemic injury pattern appreciated.  PVC present.    1415 hrs.: Sinus rhythm with a ventricular rate of 77 bpm.  QRS Emmel 80 ms.  QTc 425 ms.  No acute ischemic injury pattern appreciated.      Labs Reviewed   COMPREHENSIVE METABOLIC PANEL - Normal       Result Value    Glucose 94      Sodium 138      Potassium 3.9      Chloride 105      Bicarbonate 23      Anion Gap 14      Urea Nitrogen 9      Creatinine 0.92      eGFR >90      Calcium 9.5      Albumin 4.5      Alkaline Phosphatase 80      Total Protein 7.3      AST 12      Bilirubin, Total 0.4      ALT 13     MAGNESIUM - Normal    Magnesium 1.63     SARS-COV-2 AND INFLUENZA A/B PCR - Normal    Flu A Result Not Detected      Flu B Result Not Detected      Coronavirus 2019, PCR Not Detected      Narrative:     This assay is an FDA-cleared, in vitro diagnostic nucleic acid amplification test for the qualitative detection and differentiation of SARS CoV-2/ Influenza A/B from nasopharyngeal specimens collected from individuals with signs and symptoms of respiratory  tract infections, and has been validated for use at [facility]. Negative results do not preclude COVID-19/ Influenza A/B infections and should not be used as the sole basis for diagnosis, treatment, or other management decisions. Testing for SARS CoV-2 is recommended only for patients who meet current clinical and/or epidemiological criteria defined by federal, state, or local public health directives.   SERIAL TROPONIN-INITIAL - Normal    Troponin I, High Sensitivity 7      Narrative:     Less than 99th percentile of normal range cutoff-  Female and children under 18 years old <14 ng/L; Male <21 ng/L: Negative  Repeat testing should be performed if clinically indicated.     Female and children under 18 years old 14-50 ng/L; Male 21-50 ng/L:  Consistent with possible cardiac damage and possible increased clinical   risk. Serial measurements may help to assess extent of myocardial damage.     >50 ng/L: Consistent with cardiac damage, increased clinical risk and  myocardial infarction. Serial measurements may help assess extent of   myocardial damage.      NOTE: Children less than 1 year old may have higher baseline troponin   levels and results should be interpreted in conjunction with the overall   clinical context.     NOTE: Troponin I testing is performed using a different   testing methodology at [facility] than at [facility]. Direct result comparisons should only   be made within the same method.   SERIAL TROPONIN, 1 HOUR - Normal    Troponin I, High Sensitivity 10      Narrative:     Less than 99th percentile of normal range cutoff-  Female and children under 18 years old <14 ng/L; Male <21 ng/L: Negative  Repeat testing should be performed if clinically indicated.     Female and children under 18 years old 14-50 ng/L; Male 21-50 ng/L:  Consistent with possible cardiac damage and possible increased clinical   risk. Serial measurements may help to assess extent  of myocardial damage.     >50 ng/L: Consistent with cardiac damage, increased clinical risk and  myocardial infarction. Serial measurements may help assess extent of   myocardial damage.      NOTE: Children less than 1 year old may have higher baseline troponin   levels and results should be interpreted in conjunction with the overall   clinical context.     NOTE: Troponin I testing is performed using a different   testing methodology at [facility] than at other   [facility]. Direct result comparisons should only   be made within the same method.   TROPONIN SERIES- (INITIAL, 1 HR)    Narrative:     The following orders were created for panel order Troponin I Series, High Sensitivity (0, 1 HR).  Procedure                               Abnormality         Status                     ---------                               -----------         ------                     Troponin I, High Sensiti...[[id number]]  Normal              Final result               Troponin, High Sensitivi...[[id number]]  Normal              Final result                 Please view results for these tests on the individual orders.   CBC WITH AUTO DIFFERENTIAL    WBC 10.1      nRBC 0.0      RBC 4.84      Hemoglobin 14.7      Hematocrit 43.8      MCV 91      MCH 30.4      MCHC 33.6      RDW 13.2      Platelets 283      Neutrophils % 62.1      Immature Granulocytes %, Automated 0.2      Lymphocytes % 31.7      Monocytes % 4.8      Eosinophils % 0.4      Basophils % 0.8      Neutrophils Absolute 6.24      Immature Granulocytes Absolute, Automated 0.02      Lymphocytes Absolute 3.19      Monocytes Absolute 0.48      Eosinophils Absolute 0.04      Basophils Absolute 0.08       XR chest 1 view   Final Result   No radiographic evidence of acute cardiopulmonary disease.   Signed by Kirk Urrutia MD            Procedure  Procedures     Amandeep Araya DO  02/28/25 [id number]

## 2025-02-28 NOTE — DISCHARGE INSTRUCTIONS
Please follow up with your Primary Care Provider (PCP) within the next 2-3 days regarding your ED visit.  Seek immediate medical attention if you develop: worsening chest pain, new chest pain, nausea, vomiting, weakness, numbness, tingling, excessive sweating, shortness of breath, difficulty breathing, loss of motion in your arms or legs, or any new or worsening symptoms.  These documents have a lot of useful information! Please read them, so you know what to expect, what you can do for yourself at home, and also to know concerning signs warrant a return to the Emergency Department for additional evaluation.  You are welcome back any time. Thank you for entrusting your care to us, I hope we made your visit as pleasant as possible. Wishing you well!    Dr. Araya

## 2025-03-03 NOTE — PROGRESS NOTES
"Subjective   Patient ID: Micah Beck is a 64 y.o. male who presents for Hospital Follow-up.  HPI      Still has had some shortness of breath   Having a cerrato time sleeping, coughing when he lays down         Review of Systems   Constitutional:  Negative for activity change and fatigue.   HENT:  Negative for congestion and sore throat.    Eyes:  Negative for discharge.   Respiratory:  Negative for cough, chest tightness and shortness of breath.    Cardiovascular:  Negative for chest pain and leg swelling.   Gastrointestinal:  Negative for abdominal pain, blood in stool, constipation, diarrhea, nausea and vomiting.   Endocrine: Negative for cold intolerance and heat intolerance.   Genitourinary:  Negative for difficulty urinating and hematuria.   Musculoskeletal:  Negative for arthralgias, back pain, gait problem, myalgias and neck pain.   Allergic/Immunologic: Negative for environmental allergies.   Neurological:  Negative for dizziness, syncope, weakness, numbness and headaches.   Hematological:  Negative for adenopathy. Does not bruise/bleed easily.   Psychiatric/Behavioral:  Negative for dysphoric mood. The patient is not nervous/anxious.    All other systems reviewed and are negative.      Objective   /85 (BP Location: Right arm, BP Cuff Size: Large adult)   Pulse 91   Ht 1.88 m (6' 2\")   Wt 102 kg (225 lb)   SpO2 95%   BMI 28.89 kg/m²    Physical Exam  Vitals and nursing note reviewed.   Constitutional:       General: He is not in acute distress.     Appearance: Normal appearance.   HENT:      Head: Normocephalic and atraumatic.      Right Ear: Tympanic membrane, ear canal and external ear normal.      Left Ear: Tympanic membrane, ear canal and external ear normal.      Nose: Nose normal.      Mouth/Throat:      Mouth: Mucous membranes are moist.      Pharynx: Oropharynx is clear. No oropharyngeal exudate or posterior oropharyngeal erythema.   Eyes:      Extraocular Movements: Extraocular movements " intact.      Conjunctiva/sclera: Conjunctivae normal.      Pupils: Pupils are equal, round, and reactive to light.   Cardiovascular:      Rate and Rhythm: Normal rate and regular rhythm.      Pulses: Normal pulses.      Heart sounds: Normal heart sounds. No murmur heard.  Pulmonary:      Effort: Pulmonary effort is normal. No respiratory distress.      Breath sounds: Normal breath sounds. No wheezing or rales.   Abdominal:      General: Abdomen is flat. Bowel sounds are normal. There is no distension.      Palpations: Abdomen is soft. There is no mass.      Tenderness: There is no abdominal tenderness.   Musculoskeletal:         General: No swelling or deformity. Normal range of motion.      Cervical back: Normal range of motion and neck supple.      Right lower leg: No edema.      Left lower leg: No edema.   Lymphadenopathy:      Cervical: No cervical adenopathy.   Skin:     General: Skin is warm and dry.      Capillary Refill: Capillary refill takes less than 2 seconds.      Findings: No lesion or rash.   Neurological:      General: No focal deficit present.      Mental Status: He is alert and oriented to person, place, and time.      Cranial Nerves: No cranial nerve deficit.      Motor: No weakness.   Psychiatric:         Mood and Affect: Mood normal.         Behavior: Behavior normal.         Thought Content: Thought content normal.         Judgment: Judgment normal.         Assessment/Plan   Problem List Items Addressed This Visit       Current mild episode of major depressive disorder without prior episode (CMS-HCC) - Primary    Claudication (CMS-[facility])     Other Visit Diagnoses       Tobacco abuse        Cigarette nicotine dependence without complication        Chronic sinus complaints        Screening for lung cancer        Relevant Orders    CT lung screening low dose    Mixed hyperlipidemia        Primary hypertension        Relevant Medications    lisinopril 10 mg tablet            Patient education  provided.  Stay current with age appropriate health maintenance as instructed.  Appointment here or ER with new or worsening symptoms'  Keep appropriate follow-up visit.  Stay current with proper immunizations

## 2025-03-04 ENCOUNTER — OFFICE VISIT (OUTPATIENT)
Dept: PRIMARY CARE | Facility: CLINIC | Age: 65
End: 2025-03-04
Payer: COMMERCIAL

## 2025-03-04 VITALS
OXYGEN SATURATION: 95 % | WEIGHT: 225 LBS | DIASTOLIC BLOOD PRESSURE: 85 MMHG | BODY MASS INDEX: 28.88 KG/M2 | SYSTOLIC BLOOD PRESSURE: 163 MMHG | HEIGHT: 74 IN | HEART RATE: 91 BPM

## 2025-03-04 DIAGNOSIS — R09.89 CHRONIC SINUS COMPLAINTS: ICD-10-CM

## 2025-03-04 DIAGNOSIS — Z12.2 SCREENING FOR LUNG CANCER: ICD-10-CM

## 2025-03-04 DIAGNOSIS — E78.2 MIXED HYPERLIPIDEMIA: ICD-10-CM

## 2025-03-04 DIAGNOSIS — I73.9 CLAUDICATION (CMS-HCC): ICD-10-CM

## 2025-03-04 DIAGNOSIS — Z13.6 ENCOUNTER FOR ABDOMINAL AORTIC ANEURYSM (AAA) SCREENING: ICD-10-CM

## 2025-03-04 DIAGNOSIS — F32.0 CURRENT MILD EPISODE OF MAJOR DEPRESSIVE DISORDER WITHOUT PRIOR EPISODE (CMS-HCC): Primary | ICD-10-CM

## 2025-03-04 DIAGNOSIS — I10 PRIMARY HYPERTENSION: ICD-10-CM

## 2025-03-04 DIAGNOSIS — Z12.11 COLON CANCER SCREENING: ICD-10-CM

## 2025-03-04 DIAGNOSIS — Z72.0 TOBACCO ABUSE: ICD-10-CM

## 2025-03-04 DIAGNOSIS — F17.210 CIGARETTE NICOTINE DEPENDENCE WITHOUT COMPLICATION: ICD-10-CM

## 2025-03-04 PROCEDURE — 99214 OFFICE O/P EST MOD 30 MIN: CPT | Performed by: FAMILY MEDICINE

## 2025-03-04 PROCEDURE — 1160F RVW MEDS BY RX/DR IN RCRD: CPT | Performed by: FAMILY MEDICINE

## 2025-03-04 PROCEDURE — 3077F SYST BP >= 140 MM HG: CPT | Performed by: FAMILY MEDICINE

## 2025-03-04 PROCEDURE — 3079F DIAST BP 80-89 MM HG: CPT | Performed by: FAMILY MEDICINE

## 2025-03-04 PROCEDURE — 3008F BODY MASS INDEX DOCD: CPT | Performed by: FAMILY MEDICINE

## 2025-03-04 PROCEDURE — 1159F MED LIST DOCD IN RCRD: CPT | Performed by: FAMILY MEDICINE

## 2025-03-04 PROCEDURE — 1123F ACP DISCUSS/DSCN MKR DOCD: CPT | Performed by: FAMILY MEDICINE

## 2025-03-04 RX ORDER — LISINOPRIL 10 MG/1
10 TABLET ORAL DAILY
Qty: 30 TABLET | Refills: 5 | Status: SHIPPED | OUTPATIENT
Start: 2025-03-04 | End: 2025-08-31

## 2025-03-04 RX ORDER — LORAZEPAM 0.5 MG/1
0.5 TABLET ORAL 2 TIMES DAILY PRN
Qty: 30 TABLET | Refills: 0 | OUTPATIENT
Start: 2025-03-04 | End: 2025-06-02

## 2025-03-04 ASSESSMENT — ENCOUNTER SYMPTOMS
SHORTNESS OF BREATH: 0
MYALGIAS: 0
VOMITING: 0
CHEST TIGHTNESS: 0
ARTHRALGIAS: 0
NAUSEA: 0
NECK PAIN: 0
NUMBNESS: 0
BRUISES/BLEEDS EASILY: 0
BACK PAIN: 0
SORE THROAT: 0
ABDOMINAL PAIN: 0
DYSPHORIC MOOD: 0
ADENOPATHY: 0
HEADACHES: 0
ACTIVITY CHANGE: 0
DIZZINESS: 0
NERVOUS/ANXIOUS: 0
BLOOD IN STOOL: 0
COUGH: 0
CONSTIPATION: 0
HEMATURIA: 0
DIFFICULTY URINATING: 0
EYE DISCHARGE: 0
WEAKNESS: 0
FATIGUE: 0
DIARRHEA: 0

## 2025-03-18 ENCOUNTER — HOSPITAL ENCOUNTER (OUTPATIENT)
Dept: RADIOLOGY | Facility: HOSPITAL | Age: 65
Discharge: HOME | End: 2025-03-18
Payer: COMMERCIAL

## 2025-03-18 DIAGNOSIS — Z12.2 SCREENING FOR LUNG CANCER: ICD-10-CM

## 2025-03-18 DIAGNOSIS — Z13.6 ENCOUNTER FOR ABDOMINAL AORTIC ANEURYSM (AAA) SCREENING: ICD-10-CM

## 2025-03-18 PROCEDURE — 71271 CT THORAX LUNG CANCER SCR C-: CPT | Mod: 52

## 2025-03-18 PROCEDURE — 76706 US ABDL AORTA SCREEN AAA: CPT

## 2025-03-18 PROCEDURE — 76706 US ABDL AORTA SCREEN AAA: CPT | Performed by: RADIOLOGY

## 2025-03-26 DIAGNOSIS — I73.9 CLAUDICATION: ICD-10-CM

## 2025-03-26 DIAGNOSIS — I70.90 ATHEROSCLEROSIS: ICD-10-CM

## 2025-03-26 NOTE — TELEPHONE ENCOUNTER
Rx Refill Request Telephone Encounter    Name:  Micah Beck  :  [id number]  Medication Name:    atorvastatin (Lipitor) 40 mg tablet   aspirin 81 mg EC tablet    PT's wife asked if Dr. Nguyen would take over these rx's. Please advise. PT is out of atorvastatin.     Specific Pharmacy location:  [facility]  Date of last appointment:  3/4/25  Date of next appointment:  25  Best number to reach patient:  [phone]

## 2025-03-27 NOTE — PROGRESS NOTES
"Subjective   Patient ID: Micah Beck is a 65 y.o. male who presents for Hypertension.  HPI  Patient here for follow-up on hypertension  Hypertension reasonably controlled  Needs to update blood work and stay current with medicine  Lifestyle modification and risk factor modification    Insomnia ongoing  Medicine helpful  Discussed medication use    Anxiety ongoing  Would benefit from counseling  Would like medication as well  No progression or worsening  Stay current with proper medicine treatment    Patient due for prostate cancer screening  Review of Systems   Constitutional:  Negative for activity change and fatigue.   HENT:  Negative for congestion and sore throat.    Eyes:  Negative for discharge.   Respiratory:  Negative for cough, chest tightness and shortness of breath.    Cardiovascular:  Negative for chest pain and leg swelling.   Gastrointestinal:  Negative for abdominal pain, blood in stool, constipation, diarrhea, nausea and vomiting.   Endocrine: Negative for cold intolerance and heat intolerance.   Genitourinary:  Negative for difficulty urinating and hematuria.   Musculoskeletal:  Negative for arthralgias, back pain, gait problem, myalgias and neck pain.   Allergic/Immunologic: Negative for environmental allergies.   Neurological:  Negative for dizziness, syncope, weakness, numbness and headaches.   Hematological:  Negative for adenopathy. Does not bruise/bleed easily.   Psychiatric/Behavioral:  Negative for dysphoric mood. The patient is not nervous/anxious.    All other systems reviewed and are negative.      Objective   /68 (BP Location: Left arm, BP Cuff Size: Large adult)   Pulse 76   Ht 1.88 m (6' 2\")   Wt 97.2 kg (214 lb 3.2 oz)   SpO2 96%   BMI 27.50 kg/m²    Physical Exam  Vitals and nursing note reviewed.   Constitutional:       General: He is not in acute distress.     Appearance: Normal appearance.   HENT:      Head: Normocephalic and atraumatic.      Right Ear: Tympanic " membrane, ear canal and external ear normal.      Left Ear: Tympanic membrane, ear canal and external ear normal.      Nose: Nose normal.      Mouth/Throat:      Mouth: Mucous membranes are moist.      Pharynx: Oropharynx is clear. No oropharyngeal exudate or posterior oropharyngeal erythema.   Eyes:      Extraocular Movements: Extraocular movements intact.      Conjunctiva/sclera: Conjunctivae normal.      Pupils: Pupils are equal, round, and reactive to light.   Cardiovascular:      Rate and Rhythm: Normal rate and regular rhythm.      Pulses: Normal pulses.      Heart sounds: Normal heart sounds. No murmur heard.  Pulmonary:      Effort: Pulmonary effort is normal. No respiratory distress.      Breath sounds: Normal breath sounds. No wheezing or rales.   Abdominal:      General: Abdomen is flat. Bowel sounds are normal. There is no distension.      Palpations: Abdomen is soft. There is no mass.      Tenderness: There is no abdominal tenderness.   Musculoskeletal:         General: No swelling or deformity. Normal range of motion.      Cervical back: Normal range of motion and neck supple.      Right lower leg: No edema.      Left lower leg: No edema.   Lymphadenopathy:      Cervical: No cervical adenopathy.   Skin:     General: Skin is warm and dry.      Capillary Refill: Capillary refill takes less than 2 seconds.      Findings: No lesion or rash.   Neurological:      General: No focal deficit present.      Mental Status: He is alert and oriented to person, place, and time.      Cranial Nerves: No cranial nerve deficit.      Motor: No weakness.   Psychiatric:         Mood and Affect: Mood normal.         Behavior: Behavior normal.         Thought Content: Thought content normal.         Judgment: Judgment normal.         Assessment/Plan   Problem List Items Addressed This Visit       Primary hypertension - Primary    Relevant Orders    Lipid Panel     Other Visit Diagnoses         Primary insomnia        Relevant  Medications    mirtazapine (Remeron) 15 mg tablet    hydrOXYzine HCL (Atarax) 25 mg tablet      Anxiety        Relevant Medications    mirtazapine (Remeron) 15 mg tablet    hydrOXYzine HCL (Atarax) 25 mg tablet    Other Relevant Orders    Referral to Psychology      Prostate cancer screening        Relevant Orders    Prostate Specific Antigen            Patient education provided.  Stay current with age appropriate health maintenance as instructed.  Appointment here or ER with new or worsening symptoms'  Keep appropriate follow-up visit.  Stay current with proper immunizations   Report suicidal ideation  Report psychotic or manic symptoms  Referral to psychology as above  Meds as noted

## 2025-03-28 LAB — NONINV COLON CA DNA+OCC BLD SCRN STL QL: NEGATIVE

## 2025-03-31 RX ORDER — ATORVASTATIN CALCIUM 40 MG/1
40 TABLET, FILM COATED ORAL DAILY
Qty: 90 TABLET | Refills: 3 | Status: SHIPPED | OUTPATIENT
Start: 2025-03-31 | End: 2026-03-31

## 2025-03-31 RX ORDER — ASPIRIN 81 MG/1
81 TABLET ORAL DAILY
Qty: 90 TABLET | Refills: 3 | Status: SHIPPED | OUTPATIENT
Start: 2025-03-31 | End: 2026-03-31

## 2025-04-11 ENCOUNTER — APPOINTMENT (OUTPATIENT)
Dept: PRIMARY CARE | Facility: CLINIC | Age: 65
End: 2025-04-11
Payer: COMMERCIAL

## 2025-04-25 ENCOUNTER — APPOINTMENT (OUTPATIENT)
Dept: PRIMARY CARE | Facility: CLINIC | Age: 65
End: 2025-04-25
Payer: COMMERCIAL

## 2025-04-25 VITALS
HEART RATE: 76 BPM | WEIGHT: 214.2 LBS | DIASTOLIC BLOOD PRESSURE: 68 MMHG | OXYGEN SATURATION: 96 % | HEIGHT: 74 IN | SYSTOLIC BLOOD PRESSURE: 123 MMHG | BODY MASS INDEX: 27.49 KG/M2

## 2025-04-25 DIAGNOSIS — I10 PRIMARY HYPERTENSION: Primary | ICD-10-CM

## 2025-04-25 DIAGNOSIS — F41.9 ANXIETY: ICD-10-CM

## 2025-04-25 DIAGNOSIS — F51.01 PRIMARY INSOMNIA: ICD-10-CM

## 2025-04-25 DIAGNOSIS — Z12.5 PROSTATE CANCER SCREENING: ICD-10-CM

## 2025-04-25 PROCEDURE — 3008F BODY MASS INDEX DOCD: CPT | Performed by: FAMILY MEDICINE

## 2025-04-25 PROCEDURE — 3074F SYST BP LT 130 MM HG: CPT | Performed by: FAMILY MEDICINE

## 2025-04-25 PROCEDURE — 99214 OFFICE O/P EST MOD 30 MIN: CPT | Performed by: FAMILY MEDICINE

## 2025-04-25 PROCEDURE — 1159F MED LIST DOCD IN RCRD: CPT | Performed by: FAMILY MEDICINE

## 2025-04-25 PROCEDURE — 1160F RVW MEDS BY RX/DR IN RCRD: CPT | Performed by: FAMILY MEDICINE

## 2025-04-25 PROCEDURE — 1123F ACP DISCUSS/DSCN MKR DOCD: CPT | Performed by: FAMILY MEDICINE

## 2025-04-25 PROCEDURE — 3078F DIAST BP <80 MM HG: CPT | Performed by: FAMILY MEDICINE

## 2025-04-25 RX ORDER — MIRTAZAPINE 15 MG/1
15 TABLET, FILM COATED ORAL NIGHTLY
Qty: 30 TABLET | Refills: 3 | Status: SHIPPED | OUTPATIENT
Start: 2025-04-25 | End: 2025-08-23

## 2025-04-25 RX ORDER — HYDROXYZINE HYDROCHLORIDE 25 MG/1
25 TABLET, FILM COATED ORAL 3 TIMES DAILY
Qty: 60 TABLET | Refills: 1 | Status: SHIPPED | OUTPATIENT
Start: 2025-04-25 | End: 2025-06-24

## 2025-04-25 ASSESSMENT — ENCOUNTER SYMPTOMS
HEMATURIA: 0
BLOOD IN STOOL: 0
FATIGUE: 0
CONSTIPATION: 0
VOMITING: 0
BACK PAIN: 0
MYALGIAS: 0
ABDOMINAL PAIN: 0
NERVOUS/ANXIOUS: 0
CHEST TIGHTNESS: 0
WEAKNESS: 0
COUGH: 0
SHORTNESS OF BREATH: 0
HEADACHES: 0
ACTIVITY CHANGE: 0
DYSPHORIC MOOD: 0
ARTHRALGIAS: 0
SORE THROAT: 0
EYE DISCHARGE: 0
ADENOPATHY: 0
DIFFICULTY URINATING: 0
NAUSEA: 0
NECK PAIN: 0
DIARRHEA: 0
BRUISES/BLEEDS EASILY: 0
DIZZINESS: 0
NUMBNESS: 0

## 2025-05-12 ENCOUNTER — APPOINTMENT (OUTPATIENT)
Dept: OTOLARYNGOLOGY | Facility: CLINIC | Age: 65
End: 2025-05-12
Payer: COMMERCIAL

## 2025-05-12 DIAGNOSIS — J34.3 HYPERTROPHY OF NASAL TURBINATES: ICD-10-CM

## 2025-05-12 DIAGNOSIS — J34.2 DEVIATED NASAL SEPTUM: Primary | ICD-10-CM

## 2025-05-12 DIAGNOSIS — R09.89 CHRONIC SINUS COMPLAINTS: ICD-10-CM

## 2025-05-12 PROCEDURE — 1159F MED LIST DOCD IN RCRD: CPT | Performed by: OTOLARYNGOLOGY

## 2025-05-12 PROCEDURE — 99204 OFFICE O/P NEW MOD 45 MIN: CPT | Performed by: OTOLARYNGOLOGY

## 2025-05-12 PROCEDURE — 1160F RVW MEDS BY RX/DR IN RCRD: CPT | Performed by: OTOLARYNGOLOGY

## 2025-05-12 NOTE — PROGRESS NOTES
Subjective   Patient ID: Micah Beck is a 65 y.o. male who presents for CHRONIC SINUS ISSUES.    HPI  New patient being seen for nasal concerns. Reports constant difficulty breathing through right side of nose for at least 10 years. Feels like right side of nose collapses, which exacerbates anxiety issues. Uses nasal strips at night with minimal relief. Denies history of nasal trauma or fracture. All remaining head neck inquiry otherwise negative.     Review of Systems   Constitutional: Negative.    HENT: Negative.     Respiratory: Negative.     Cardiovascular: Negative.    Neurological: Negative.      Physical Exam  General appearance: No acute distress. Normal facies. Symmetric facial movement. No gross lesions of the face are noted.  Ears:  The external ear structures appear normal. The ear canals patent and the tympanic membranes are intact without evidence of air-fluid levels, retraction, or congenital defects.    Nose:  Anterior rhinoscopy notes a septum that deviates to right anteriorly. Examination is noted for normal healthy mucosal membranes without any evidence of lesions, polyps, or exudate.   Throat/Oral mucosa:  The tongue is normally mobile. There are no lesions on the gingiva, buccal, or oral mucosa. There are no oral cavity masses.  Neck:  The neck is negative for mass lymphadenopathy. The trachea and parotid are clear. The thyroid bed is grossly unremarkable. The salivary gland structures are grossly unremarkable.    Assessment/Plan     Deviated nasal septum  Hypertrophy of nasal turbinates    At this time in light of patient's symptoms, I would recommend septoplasty and turbinate reduction.   Detailed discussion regarding the operative indication along the alternatives and risks and benefits.  I have had a very thorough discussion with the patient and family members as necessary regarding indication for surgery as well as the risks and benefits which include, but are not limited to, bleeding,  infection, alteration of sense of smell, nasal dryness, recurrence of disease, and failure to clear all symptomatology. All questions were answered and the patient agrees to proceed and this will be scheduled at the patient's convenience in the very near future.      Thank you again for allowing us to participate in the care of this patient.    This note was created with voice recognition software and has not been corrected for typographical or grammatical errors.

## 2025-05-20 DIAGNOSIS — J34.3 HYPERTROPHY OF NASAL TURBINATES: ICD-10-CM

## 2025-05-20 DIAGNOSIS — R09.89 CHRONIC SINUS COMPLAINTS: Primary | ICD-10-CM

## 2025-05-20 DIAGNOSIS — J34.2 DEVIATED NASAL SEPTUM: ICD-10-CM

## 2025-05-28 ENCOUNTER — TELEPHONE (OUTPATIENT)
Dept: PRIMARY CARE | Facility: CLINIC | Age: 65
End: 2025-05-28
Payer: COMMERCIAL

## 2025-05-28 NOTE — TELEPHONE ENCOUNTER
Patients wife Bailey called in stating the patient is scheduled for a procedure on 6/12. She is wanting to make sure it is OK for the patient to discontinue this medication 10 days prior to this procedure? Bailey is wondering if there are any other prescriptions that should be temporarily discontinued?   Please Advise

## 2025-06-03 ENCOUNTER — APPOINTMENT (OUTPATIENT)
Dept: PRIMARY CARE | Facility: CLINIC | Age: 65
End: 2025-06-03
Payer: COMMERCIAL

## 2025-06-03 ENCOUNTER — LAB (OUTPATIENT)
Dept: LAB | Facility: HOSPITAL | Age: 65
End: 2025-06-03
Payer: COMMERCIAL

## 2025-06-03 VITALS
WEIGHT: 209.4 LBS | HEART RATE: 90 BPM | OXYGEN SATURATION: 96 % | HEIGHT: 74 IN | SYSTOLIC BLOOD PRESSURE: 118 MMHG | BODY MASS INDEX: 26.87 KG/M2 | DIASTOLIC BLOOD PRESSURE: 64 MMHG

## 2025-06-03 DIAGNOSIS — J34.2 NASAL SEPTAL DEVIATION: ICD-10-CM

## 2025-06-03 DIAGNOSIS — R09.89 CHRONIC SINUS COMPLAINTS: ICD-10-CM

## 2025-06-03 DIAGNOSIS — J34.3 HYPERTROPHY OF NASAL TURBINATES: ICD-10-CM

## 2025-06-03 DIAGNOSIS — F32.0 CURRENT MILD EPISODE OF MAJOR DEPRESSIVE DISORDER WITHOUT PRIOR EPISODE: ICD-10-CM

## 2025-06-03 DIAGNOSIS — I10 PRIMARY HYPERTENSION: Primary | ICD-10-CM

## 2025-06-03 DIAGNOSIS — J34.2 DEVIATED NASAL SEPTUM: ICD-10-CM

## 2025-06-03 LAB
ALBUMIN SERPL BCP-MCNC: 4.3 G/DL (ref 3.4–5)
ALP SERPL-CCNC: 84 U/L (ref 33–136)
ALT SERPL W P-5'-P-CCNC: 19 U/L (ref 10–52)
ANION GAP SERPL CALC-SCNC: 10 MMOL/L (ref 10–20)
AST SERPL W P-5'-P-CCNC: 16 U/L (ref 9–39)
BILIRUB SERPL-MCNC: 0.3 MG/DL (ref 0–1.2)
BUN SERPL-MCNC: 15 MG/DL (ref 6–23)
CALCIUM SERPL-MCNC: 9.4 MG/DL (ref 8.6–10.3)
CHLORIDE SERPL-SCNC: 107 MMOL/L (ref 98–107)
CO2 SERPL-SCNC: 27 MMOL/L (ref 21–32)
CREAT SERPL-MCNC: 1 MG/DL (ref 0.5–1.3)
EGFRCR SERPLBLD CKD-EPI 2021: 83 ML/MIN/1.73M*2
ERYTHROCYTE [DISTWIDTH] IN BLOOD BY AUTOMATED COUNT: 13.2 % (ref 11.5–14.5)
GLUCOSE SERPL-MCNC: 92 MG/DL (ref 74–99)
HCT VFR BLD AUTO: 44.8 % (ref 41–52)
HGB BLD-MCNC: 14.9 G/DL (ref 13.5–17.5)
MCH RBC QN AUTO: 30.8 PG (ref 26–34)
MCHC RBC AUTO-ENTMCNC: 33.3 G/DL (ref 32–36)
MCV RBC AUTO: 93 FL (ref 80–100)
NRBC BLD-RTO: 0 /100 WBCS (ref 0–0)
PLATELET # BLD AUTO: 261 X10*3/UL (ref 150–450)
POTASSIUM SERPL-SCNC: 4.3 MMOL/L (ref 3.5–5.3)
PROT SERPL-MCNC: 6.7 G/DL (ref 6.4–8.2)
RBC # BLD AUTO: 4.83 X10*6/UL (ref 4.5–5.9)
SODIUM SERPL-SCNC: 140 MMOL/L (ref 136–145)
WBC # BLD AUTO: 7.3 X10*3/UL (ref 4.4–11.3)

## 2025-06-03 PROCEDURE — 36415 COLL VENOUS BLD VENIPUNCTURE: CPT

## 2025-06-03 PROCEDURE — 3008F BODY MASS INDEX DOCD: CPT | Performed by: FAMILY MEDICINE

## 2025-06-03 PROCEDURE — 85027 COMPLETE CBC AUTOMATED: CPT

## 2025-06-03 PROCEDURE — 1159F MED LIST DOCD IN RCRD: CPT | Performed by: FAMILY MEDICINE

## 2025-06-03 PROCEDURE — 3074F SYST BP LT 130 MM HG: CPT | Performed by: FAMILY MEDICINE

## 2025-06-03 PROCEDURE — 3078F DIAST BP <80 MM HG: CPT | Performed by: FAMILY MEDICINE

## 2025-06-03 PROCEDURE — 1160F RVW MEDS BY RX/DR IN RCRD: CPT | Performed by: FAMILY MEDICINE

## 2025-06-03 PROCEDURE — 99214 OFFICE O/P EST MOD 30 MIN: CPT | Performed by: FAMILY MEDICINE

## 2025-06-03 PROCEDURE — 80053 COMPREHEN METABOLIC PANEL: CPT

## 2025-06-03 ASSESSMENT — ENCOUNTER SYMPTOMS
ABDOMINAL PAIN: 0
DIARRHEA: 0
CHEST TIGHTNESS: 0
CONSTIPATION: 0
EYE DISCHARGE: 0
MYALGIAS: 0
ADENOPATHY: 0
NAUSEA: 0
DYSPHORIC MOOD: 0
HEMATURIA: 0
VOMITING: 0
HEADACHES: 0
SHORTNESS OF BREATH: 0
ARTHRALGIAS: 0
WEAKNESS: 0
BRUISES/BLEEDS EASILY: 0
BLOOD IN STOOL: 0
NERVOUS/ANXIOUS: 0
DIFFICULTY URINATING: 0
FATIGUE: 0
NECK PAIN: 0
COUGH: 0
DIZZINESS: 0
SORE THROAT: 0
BACK PAIN: 0
NUMBNESS: 0
ACTIVITY CHANGE: 0

## 2025-06-03 NOTE — PROGRESS NOTES
"Subjective   Patient ID: Micah Beck is a 65 y.o. male who presents for Pre-op Exam.  HPI    Nose surgery on 6/12  No contraindications to planned surgery    Hypertension stable no chest pain or shortness of breath    Depressed mood stable  No suicidal ideation no psychotic or manic symptoms    Patient here today with spouse      Review of Systems   Constitutional:  Negative for activity change and fatigue.   HENT:  Negative for congestion and sore throat.    Eyes:  Negative for discharge.   Respiratory:  Negative for cough, chest tightness and shortness of breath.    Cardiovascular:  Negative for chest pain and leg swelling.   Gastrointestinal:  Negative for abdominal pain, blood in stool, constipation, diarrhea, nausea and vomiting.   Endocrine: Negative for cold intolerance and heat intolerance.   Genitourinary:  Negative for difficulty urinating and hematuria.   Musculoskeletal:  Negative for arthralgias, back pain, gait problem, myalgias and neck pain.   Allergic/Immunologic: Negative for environmental allergies.   Neurological:  Negative for dizziness, syncope, weakness, numbness and headaches.   Hematological:  Negative for adenopathy. Does not bruise/bleed easily.   Psychiatric/Behavioral:  Negative for dysphoric mood. The patient is not nervous/anxious.    All other systems reviewed and are negative.      Objective   /64 (BP Location: Right arm, BP Cuff Size: Large adult)   Pulse 90   Ht 1.88 m (6' 2\")   Wt 95 kg (209 lb 6.4 oz)   SpO2 96%   BMI 26.89 kg/m²    Physical Exam  Vitals and nursing note reviewed.   Constitutional:       General: He is not in acute distress.     Appearance: Normal appearance.   HENT:      Head: Normocephalic and atraumatic.      Right Ear: Tympanic membrane, ear canal and external ear normal.      Left Ear: Tympanic membrane, ear canal and external ear normal.      Nose: Nose normal.      Mouth/Throat:      Mouth: Mucous membranes are moist.      Pharynx: Oropharynx " is clear. No oropharyngeal exudate or posterior oropharyngeal erythema.   Eyes:      Extraocular Movements: Extraocular movements intact.      Conjunctiva/sclera: Conjunctivae normal.      Pupils: Pupils are equal, round, and reactive to light.   Cardiovascular:      Rate and Rhythm: Normal rate and regular rhythm.      Pulses: Normal pulses.      Heart sounds: Normal heart sounds. No murmur heard.  Pulmonary:      Effort: Pulmonary effort is normal. No respiratory distress.      Breath sounds: Normal breath sounds. No wheezing or rales.   Abdominal:      General: Abdomen is flat. Bowel sounds are normal. There is no distension.      Palpations: Abdomen is soft. There is no mass.      Tenderness: There is no abdominal tenderness.   Musculoskeletal:         General: No swelling or deformity. Normal range of motion.      Cervical back: Normal range of motion and neck supple.      Right lower leg: No edema.      Left lower leg: No edema.   Lymphadenopathy:      Cervical: No cervical adenopathy.   Skin:     General: Skin is warm and dry.      Capillary Refill: Capillary refill takes less than 2 seconds.      Findings: No lesion or rash.   Neurological:      General: No focal deficit present.      Mental Status: He is alert and oriented to person, place, and time.      Cranial Nerves: No cranial nerve deficit.      Motor: No weakness.   Psychiatric:         Mood and Affect: Mood normal.         Behavior: Behavior normal.         Thought Content: Thought content normal.         Judgment: Judgment normal.         Assessment/Plan   Problem List Items Addressed This Visit       Current mild episode of major depressive disorder without prior episode    Primary hypertension - Primary    Relevant Orders    Follow Up In [facility] - PCP     Other Visit Diagnoses         Nasal septal deviation                Patient education provided.  Stay current with age appropriate health maintenance as instructed.  Appointment  here or ER with new or worsening symptoms'  Keep appropriate follow-up visit.  Stay current with proper immunizations   Patient cleared for planned surgicalprocedure    6 months  D/w spouse

## 2025-06-10 DIAGNOSIS — F41.9 ANXIETY: ICD-10-CM

## 2025-06-10 DIAGNOSIS — F51.01 PRIMARY INSOMNIA: ICD-10-CM

## 2025-06-11 RX ORDER — HYDROXYZINE HYDROCHLORIDE 25 MG/1
25 TABLET, FILM COATED ORAL 3 TIMES DAILY
Qty: 60 TABLET | Refills: 0 | Status: SHIPPED | OUTPATIENT
Start: 2025-06-11 | End: 2025-08-10

## 2025-06-11 NOTE — TELEPHONE ENCOUNTER
Rx Refill Request     Name: Micah Beck  :  1960     Date of last appointment:  6/3/2025   Date of next appointment:  2025   Best number to reach patient:  [phone]

## 2025-06-13 ENCOUNTER — APPOINTMENT (OUTPATIENT)
Dept: OTOLARYNGOLOGY | Facility: CLINIC | Age: 65
End: 2025-06-13
Payer: COMMERCIAL

## 2025-06-13 DIAGNOSIS — J34.2 DEVIATED NASAL SEPTUM: Primary | ICD-10-CM

## 2025-06-13 PROCEDURE — 99024 POSTOP FOLLOW-UP VISIT: CPT | Performed by: OTOLARYNGOLOGY

## 2025-06-13 NOTE — PROGRESS NOTES
Patient returns following septoplasty and turbinate surgery yesterday.  Here for packing removal.  Doing otherwise fine.  No postoperative issues.    Exam/procedure:  Nasal packing was removed and following same topical anesthetic applied with debridement.  Excellent result noted so far.    Assessment and plan:  Doing very well postoperative day 1 following septoplasty and turbinate surgery yesterday.  Continue with routine postop instruction.  See me 10 days for removal of splints.  Contact me sooner with issue.  All questions were answered in this regard accordingly.

## 2025-06-20 ENCOUNTER — APPOINTMENT (OUTPATIENT)
Dept: OTOLARYNGOLOGY | Facility: CLINIC | Age: 65
End: 2025-06-20
Payer: COMMERCIAL

## 2025-06-24 ENCOUNTER — APPOINTMENT (OUTPATIENT)
Dept: OTOLARYNGOLOGY | Facility: CLINIC | Age: 65
End: 2025-06-24
Payer: COMMERCIAL

## 2025-06-24 DIAGNOSIS — J34.2 DEVIATED NASAL SEPTUM: Primary | ICD-10-CM

## 2025-06-24 PROCEDURE — 99024 POSTOP FOLLOW-UP VISIT: CPT | Performed by: OTOLARYNGOLOGY

## 2025-06-24 NOTE — PROGRESS NOTES
Patient returns.  Seeing him back today for septal splint removal following uneventful septoplasty.  He is doing very well.  Breathing has been very of much improved.  He is very happy with that.    After topical anesthesia, patient had septal splints removed and nasal debrided.  Excellent result noted so far.    Assessment and plan:  Doing great following septoplasty.  Recommend observation with follow-up for final check 1 month sooner with issue with routine postop instructions relayed to patient.  All questions were answered in this regard accordingly.

## 2025-06-26 DIAGNOSIS — I10 PRIMARY HYPERTENSION: ICD-10-CM

## 2025-06-26 RX ORDER — LISINOPRIL 10 MG/1
10 TABLET ORAL DAILY
Qty: 90 TABLET | Refills: 1 | Status: SHIPPED | OUTPATIENT
Start: 2025-06-26

## 2025-07-23 ENCOUNTER — APPOINTMENT (OUTPATIENT)
Dept: OTOLARYNGOLOGY | Facility: CLINIC | Age: 65
End: 2025-07-23
Payer: COMMERCIAL

## 2025-07-23 DIAGNOSIS — J34.2 DEVIATED NASAL SEPTUM: Primary | ICD-10-CM

## 2025-07-23 DIAGNOSIS — J34.3 HYPERTROPHY OF NASAL TURBINATES: ICD-10-CM

## 2025-07-23 PROCEDURE — 99024 POSTOP FOLLOW-UP VISIT: CPT | Performed by: OTOLARYNGOLOGY

## 2025-07-23 PROCEDURE — 1160F RVW MEDS BY RX/DR IN RCRD: CPT | Performed by: OTOLARYNGOLOGY

## 2025-07-23 PROCEDURE — 1159F MED LIST DOCD IN RCRD: CPT | Performed by: OTOLARYNGOLOGY

## 2025-07-23 NOTE — PROGRESS NOTES
Subjective   Patient ID: Micah Beck is a 65 y.o. male who presents for 1 MONTH FU ON SINUSES.    HPI  The patient returns, being seen for 1-month follow-up on sinuses. History of uneventful septoplasty and turbinate reduction. Doing very well overall with no complaints. Breathing very well and is no longer snoring, which both he and his wife are very happy about.      All remaining head neck inquiry otherwise negative.     Physical Exam  Nose:  Anterior rhinoscopy notes essentially a midline nasal septum. Examination is noted for normal healthy mucosal membranes without any evidence of lesions, polyps, or exudate.     Assessment/Plan   65yr old male patient presents for 1-month follow-up on nose. Doing very well post-operatively with improved breathing and resolved snoring. Follow-up with me as needed.

## 2025-07-29 ENCOUNTER — APPOINTMENT (OUTPATIENT)
Dept: PRIMARY CARE | Facility: CLINIC | Age: 65
End: 2025-07-29
Payer: COMMERCIAL

## 2025-08-11 DIAGNOSIS — F51.01 PRIMARY INSOMNIA: ICD-10-CM

## 2025-08-11 DIAGNOSIS — F41.9 ANXIETY: ICD-10-CM

## 2025-08-11 RX ORDER — MIRTAZAPINE 15 MG/1
15 TABLET, FILM COATED ORAL NIGHTLY
Qty: 30 TABLET | Refills: 3 | Status: SHIPPED | OUTPATIENT
Start: 2025-08-11 | End: 2025-12-09

## 2025-08-26 ENCOUNTER — PATIENT MESSAGE (OUTPATIENT)
Dept: PRIMARY CARE | Facility: CLINIC | Age: 65
End: 2025-08-26
Payer: COMMERCIAL

## 2025-08-26 DIAGNOSIS — I10 PRIMARY HYPERTENSION: ICD-10-CM

## 2025-08-26 RX ORDER — LISINOPRIL 10 MG/1
10 TABLET ORAL DAILY
Qty: 90 TABLET | Refills: 0 | Status: SHIPPED | OUTPATIENT
Start: 2025-08-26

## 2025-12-08 ENCOUNTER — APPOINTMENT (OUTPATIENT)
Dept: PRIMARY CARE | Facility: CLINIC | Age: 65
End: 2025-12-08
Payer: COMMERCIAL